# Patient Record
Sex: FEMALE | Race: WHITE | NOT HISPANIC OR LATINO | Employment: UNEMPLOYED | URBAN - METROPOLITAN AREA
[De-identification: names, ages, dates, MRNs, and addresses within clinical notes are randomized per-mention and may not be internally consistent; named-entity substitution may affect disease eponyms.]

---

## 2023-10-31 DIAGNOSIS — K50.919 CROHN'S DISEASE WITH COMPLICATION, UNSPECIFIED GASTROINTESTINAL TRACT LOCATION (MULTI): ICD-10-CM

## 2023-10-31 PROBLEM — Z86.010 HISTORY OF COLON POLYPS: Status: ACTIVE | Noted: 2023-10-31

## 2023-10-31 PROBLEM — F31.9 BIPOLAR DISORDER (MULTI): Status: ACTIVE | Noted: 2023-10-31

## 2023-10-31 PROBLEM — K52.9 CHRONIC DIARRHEA: Status: ACTIVE | Noted: 2023-10-31

## 2023-10-31 PROBLEM — R63.4 WEIGHT LOSS, UNINTENTIONAL: Status: ACTIVE | Noted: 2023-10-31

## 2023-10-31 PROBLEM — K44.9 HIATAL HERNIA: Status: ACTIVE | Noted: 2023-10-31

## 2023-10-31 PROBLEM — K50.00 CROHN'S DISEASE OF SMALL INTESTINE WITHOUT COMPLICATION (MULTI): Status: ACTIVE | Noted: 2023-10-31

## 2023-10-31 PROBLEM — E46 MALNUTRITION (MULTI): Status: ACTIVE | Noted: 2023-10-31

## 2023-10-31 PROBLEM — T81.31XA RUPTURE OF OPERATION WOUND: Status: ACTIVE | Noted: 2023-10-31

## 2023-10-31 PROBLEM — Z86.0100 HISTORY OF COLON POLYPS: Status: ACTIVE | Noted: 2023-10-31

## 2023-10-31 PROBLEM — M12.9 ARTHROPATHY: Status: ACTIVE | Noted: 2023-10-31

## 2023-10-31 PROBLEM — E87.6 POTASSIUM DEFICIENCY: Status: ACTIVE | Noted: 2023-10-31

## 2023-10-31 PROBLEM — I50.22 CHRONIC SYSTOLIC HEART FAILURE (MULTI): Status: ACTIVE | Noted: 2023-10-31

## 2023-10-31 PROBLEM — M48.00 SPINAL STENOSIS: Status: ACTIVE | Noted: 2023-10-31

## 2023-10-31 PROBLEM — J45.909 ASTHMA (HHS-HCC): Status: ACTIVE | Noted: 2023-10-31

## 2023-10-31 RX ORDER — LORAZEPAM 1 MG/1
1 TABLET ORAL 3 TIMES DAILY PRN
COMMUNITY

## 2023-10-31 RX ORDER — ALBUTEROL SULFATE 0.83 MG/ML
3 SOLUTION RESPIRATORY (INHALATION) AS NEEDED
Status: CANCELLED | OUTPATIENT
Start: 2023-10-31

## 2023-10-31 RX ORDER — OXYCODONE HYDROCHLORIDE 5 MG/1
5 TABLET ORAL EVERY 6 HOURS PRN
COMMUNITY
Start: 2023-09-19

## 2023-10-31 RX ORDER — GABAPENTIN 300 MG/1
900 CAPSULE ORAL 3 TIMES DAILY
COMMUNITY
Start: 2023-10-27

## 2023-10-31 RX ORDER — LIDOCAINE 5% 5 G/100G
CREAM TOPICAL
COMMUNITY
Start: 2023-01-16

## 2023-10-31 RX ORDER — CLOPIDOGREL BISULFATE 75 MG/1
75 TABLET ORAL DAILY
COMMUNITY
Start: 2023-10-17

## 2023-10-31 RX ORDER — ALBUTEROL SULFATE 90 UG/1
2 AEROSOL, METERED RESPIRATORY (INHALATION) EVERY 4 HOURS PRN
COMMUNITY

## 2023-10-31 RX ORDER — GUAIFENESIN 600 MG/1
600 TABLET, EXTENDED RELEASE ORAL NIGHTLY
COMMUNITY
Start: 2023-10-18

## 2023-10-31 RX ORDER — POTASSIUM CHLORIDE 20 MEQ/1
20 TABLET, EXTENDED RELEASE ORAL DAILY
COMMUNITY
Start: 2023-09-19

## 2023-10-31 RX ORDER — FAMOTIDINE 10 MG/ML
20 INJECTION INTRAVENOUS ONCE AS NEEDED
Status: CANCELLED | OUTPATIENT
Start: 2023-10-31

## 2023-10-31 RX ORDER — HYDROXYZINE HYDROCHLORIDE 50 MG/1
50 TABLET, FILM COATED ORAL 4 TIMES DAILY
COMMUNITY
Start: 2023-01-16

## 2023-10-31 RX ORDER — ATORVASTATIN CALCIUM 20 MG/1
20 TABLET, FILM COATED ORAL NIGHTLY
COMMUNITY
Start: 2023-09-15

## 2023-10-31 RX ORDER — ERGOCALCIFEROL 1.25 MG/1
1.25 CAPSULE ORAL
COMMUNITY

## 2023-10-31 RX ORDER — DIPHENHYDRAMINE HYDROCHLORIDE 25 MG/1
25 CAPSULE ORAL NIGHTLY PRN
COMMUNITY
Start: 2023-09-19

## 2023-10-31 RX ORDER — DIPHENHYDRAMINE HYDROCHLORIDE 50 MG/ML
50 INJECTION INTRAMUSCULAR; INTRAVENOUS AS NEEDED
Status: CANCELLED | OUTPATIENT
Start: 2023-10-31

## 2023-10-31 RX ORDER — VILAZODONE HYDROCHLORIDE 40 MG/1
40 TABLET ORAL DAILY
COMMUNITY
Start: 2023-05-16

## 2023-10-31 RX ORDER — DOXEPIN HYDROCHLORIDE 10 MG/1
50 CAPSULE ORAL NIGHTLY
COMMUNITY
Start: 2023-07-24

## 2023-10-31 RX ORDER — EPINEPHRINE 0.3 MG/.3ML
0.3 INJECTION SUBCUTANEOUS EVERY 5 MIN PRN
Status: CANCELLED | OUTPATIENT
Start: 2023-10-31

## 2023-10-31 RX ORDER — FAMOTIDINE 40 MG/1
40 TABLET, FILM COATED ORAL 2 TIMES DAILY
COMMUNITY

## 2023-10-31 RX ORDER — ARIPIPRAZOLE 2 MG/1
2 TABLET ORAL DAILY
COMMUNITY
Start: 2023-09-09

## 2023-10-31 RX ORDER — LEVOFLOXACIN 750 MG/1
750 TABLET ORAL DAILY
COMMUNITY
Start: 2023-01-18

## 2023-10-31 RX ORDER — FOLIC ACID 0.4 MG
0.4 TABLET ORAL DAILY
COMMUNITY
Start: 2023-10-26

## 2023-10-31 RX ORDER — CETIRIZINE HYDROCHLORIDE 10 MG/1
10 TABLET ORAL DAILY
COMMUNITY
Start: 2023-09-19

## 2023-10-31 RX ORDER — ASPIRIN 81 MG/1
81 TABLET ORAL DAILY
COMMUNITY
Start: 2023-09-15

## 2023-10-31 RX ORDER — TIOTROPIUM BROMIDE INHALATION SPRAY 3.12 UG/1
2 SPRAY, METERED RESPIRATORY (INHALATION) DAILY
COMMUNITY

## 2023-11-01 ENCOUNTER — TELEPHONE (OUTPATIENT)
Dept: PAIN MEDICINE | Facility: CLINIC | Age: 53
End: 2023-11-01
Payer: COMMERCIAL

## 2023-12-15 DIAGNOSIS — K50.919 CROHN'S DISEASE WITH COMPLICATION, UNSPECIFIED GASTROINTESTINAL TRACT LOCATION (MULTI): ICD-10-CM

## 2024-10-22 ENCOUNTER — APPOINTMENT (OUTPATIENT)
Dept: CT IMAGING | Age: 54
End: 2024-10-22
Payer: COMMERCIAL

## 2024-10-22 ENCOUNTER — HOSPITAL ENCOUNTER (INPATIENT)
Age: 54
LOS: 3 days | Discharge: HOME | End: 2024-10-25
Attending: STUDENT IN AN ORGANIZED HEALTH CARE EDUCATION/TRAINING PROGRAM | Admitting: INTERNAL MEDICINE
Payer: COMMERCIAL

## 2024-10-22 ENCOUNTER — APPOINTMENT (OUTPATIENT)
Dept: GENERAL RADIOLOGY | Age: 54
End: 2024-10-22
Payer: COMMERCIAL

## 2024-10-22 DIAGNOSIS — R79.89 ELEVATED TROPONIN: Primary | ICD-10-CM

## 2024-10-22 DIAGNOSIS — J42 CHRONIC BRONCHITIS, UNSPECIFIED CHRONIC BRONCHITIS TYPE (HCC): ICD-10-CM

## 2024-10-22 DIAGNOSIS — R07.9 CHEST PAIN, UNSPECIFIED TYPE: ICD-10-CM

## 2024-10-22 DIAGNOSIS — R91.1 LUNG NODULE: ICD-10-CM

## 2024-10-22 DIAGNOSIS — I42.9 CARDIOMYOPATHY, UNSPECIFIED TYPE (HCC): ICD-10-CM

## 2024-10-22 PROBLEM — I21.4 NSTEMI (NON-ST ELEVATED MYOCARDIAL INFARCTION) (HCC): Status: ACTIVE | Noted: 2024-10-22

## 2024-10-22 LAB
ALBUMIN SERPL-MCNC: 3.3 G/DL (ref 3.5–4.6)
ALP SERPL-CCNC: 67 U/L (ref 40–130)
ALT SERPL-CCNC: 14 U/L (ref 0–33)
ANION GAP SERPL CALCULATED.3IONS-SCNC: 12 MEQ/L (ref 9–15)
AST SERPL-CCNC: 27 U/L (ref 0–35)
BASE EXCESS VENOUS: 10 (ref -3–3)
BASOPHILS # BLD: 0 K/UL (ref 0–0.2)
BASOPHILS NFR BLD: 0.3 %
BILIRUB SERPL-MCNC: 0.6 MG/DL (ref 0.2–0.7)
BNP BLD-MCNC: NORMAL PG/ML
BUN SERPL-MCNC: 20 MG/DL (ref 6–20)
CALCIUM IONIZED: 1.43 MMOL/L (ref 1.12–1.32)
CALCIUM SERPL-MCNC: 11.2 MG/DL (ref 8.5–9.9)
CHLORIDE SERPL-SCNC: 97 MEQ/L (ref 95–107)
CO2 SERPL-SCNC: 28 MEQ/L (ref 20–31)
CREAT SERPL-MCNC: 1.31 MG/DL (ref 0.5–0.9)
EOSINOPHIL # BLD: 0.5 K/UL (ref 0–0.7)
EOSINOPHIL NFR BLD: 2 %
ERYTHROCYTE [DISTWIDTH] IN BLOOD BY AUTOMATED COUNT: 13.8 % (ref 11.5–14.5)
GLOBULIN SER CALC-MCNC: 2.4 G/DL (ref 2.3–3.5)
GLUCOSE BLD-MCNC: 142 MG/DL (ref 70–99)
GLUCOSE SERPL-MCNC: 130 MG/DL (ref 70–99)
HCO3 VENOUS: 33 MMOL/L (ref 23–29)
HCT VFR BLD AUTO: 43.9 % (ref 37–47)
HCT VFR BLD AUTO: 52 % (ref 36–48)
HGB BLD CALC-MCNC: 17.8 GM/DL (ref 12–16)
HGB BLD-MCNC: 15.6 G/DL (ref 12–16)
LACTATE BLDV-SCNC: 2 MMOL/L (ref 0.5–2.2)
LACTATE: 2.42 MMOL/L (ref 0.4–2)
LYMPHOCYTES # BLD: 1 K/UL (ref 1–4.8)
LYMPHOCYTES NFR BLD: 3 %
MAGNESIUM SERPL-MCNC: 1.6 MG/DL (ref 1.7–2.4)
MCH RBC QN AUTO: 32.9 PG (ref 27–31.3)
MCHC RBC AUTO-ENTMCNC: 35.5 % (ref 33–37)
MCV RBC AUTO: 92.6 FL (ref 79.4–94.8)
MONOCYTES # BLD: 1 K/UL (ref 0.2–0.8)
MONOCYTES NFR BLD: 3.8 %
NEUTROPHILS # BLD: 21.8 K/UL (ref 1.4–6.5)
NEUTS SEG NFR BLD: 91 %
PCO2 VENOUS: 43.9 MM HG (ref 40–50)
PERFORMED ON: ABNORMAL
PH VENOUS: 7.48 (ref 7.32–7.42)
PLATELET # BLD AUTO: 342 K/UL (ref 130–400)
PLATELET BLD QL SMEAR: ADEQUATE
PO2 VENOUS: <22 MM HG
POC CHLORIDE: 95 MEQ/L (ref 99–110)
POC CREATININE: 1.7 MG/DL (ref 0.6–1.2)
POC SAMPLE TYPE: ABNORMAL
POTASSIUM SERPL-SCNC: 3.7 MEQ/L (ref 3.4–4.9)
POTASSIUM SERPL-SCNC: 4.1 MEQ/L (ref 3.5–5.1)
PROCALCITONIN SERPL IA-MCNC: 0.08 NG/ML (ref 0–0.15)
PROT SERPL-MCNC: 5.7 G/DL (ref 6.3–8)
RBC # BLD AUTO: 4.74 M/UL (ref 4.2–5.4)
SLIDE REVIEW: ABNORMAL
SMUDGE CELLS BLD QL SMEAR: 5.7
SODIUM BLD-SCNC: 136 MEQ/L (ref 136–145)
SODIUM SERPL-SCNC: 137 MEQ/L (ref 135–144)
TCO2 CALC VENOUS: 34 MMOL/L
TOXIC GRANULATION: ABNORMAL
TROPONIN, HIGH SENSITIVITY: 335 NG/L (ref 0–19)
VARIANT LYMPHS NFR BLD: 1 %
WBC # BLD AUTO: 24 K/UL (ref 4.8–10.8)

## 2024-10-22 PROCEDURE — 84132 ASSAY OF SERUM POTASSIUM: CPT

## 2024-10-22 PROCEDURE — 36415 COLL VENOUS BLD VENIPUNCTURE: CPT

## 2024-10-22 PROCEDURE — 2580000003 HC RX 258: Performed by: STUDENT IN AN ORGANIZED HEALTH CARE EDUCATION/TRAINING PROGRAM

## 2024-10-22 PROCEDURE — 82565 ASSAY OF CREATININE: CPT

## 2024-10-22 PROCEDURE — 83735 ASSAY OF MAGNESIUM: CPT

## 2024-10-22 PROCEDURE — 82800 BLOOD PH: CPT

## 2024-10-22 PROCEDURE — 6360000002 HC RX W HCPCS

## 2024-10-22 PROCEDURE — 85014 HEMATOCRIT: CPT

## 2024-10-22 PROCEDURE — 2060000000 HC ICU INTERMEDIATE R&B

## 2024-10-22 PROCEDURE — 83880 ASSAY OF NATRIURETIC PEPTIDE: CPT

## 2024-10-22 PROCEDURE — 82435 ASSAY OF BLOOD CHLORIDE: CPT

## 2024-10-22 PROCEDURE — 6360000002 HC RX W HCPCS: Performed by: STUDENT IN AN ORGANIZED HEALTH CARE EDUCATION/TRAINING PROGRAM

## 2024-10-22 PROCEDURE — 84295 ASSAY OF SERUM SODIUM: CPT

## 2024-10-22 PROCEDURE — 83605 ASSAY OF LACTIC ACID: CPT

## 2024-10-22 PROCEDURE — 96372 THER/PROPH/DIAG INJ SC/IM: CPT

## 2024-10-22 PROCEDURE — 84145 PROCALCITONIN (PCT): CPT

## 2024-10-22 PROCEDURE — 80053 COMPREHEN METABOLIC PANEL: CPT

## 2024-10-22 PROCEDURE — 6360000004 HC RX CONTRAST MEDICATION: Performed by: STUDENT IN AN ORGANIZED HEALTH CARE EDUCATION/TRAINING PROGRAM

## 2024-10-22 PROCEDURE — 94640 AIRWAY INHALATION TREATMENT: CPT

## 2024-10-22 PROCEDURE — 2500000003 HC RX 250 WO HCPCS

## 2024-10-22 PROCEDURE — 71046 X-RAY EXAM CHEST 2 VIEWS: CPT

## 2024-10-22 PROCEDURE — 84484 ASSAY OF TROPONIN QUANT: CPT

## 2024-10-22 PROCEDURE — 71275 CT ANGIOGRAPHY CHEST: CPT

## 2024-10-22 PROCEDURE — 96374 THER/PROPH/DIAG INJ IV PUSH: CPT

## 2024-10-22 PROCEDURE — 94761 N-INVAS EAR/PLS OXIMETRY MLT: CPT

## 2024-10-22 PROCEDURE — 93005 ELECTROCARDIOGRAM TRACING: CPT | Performed by: STUDENT IN AN ORGANIZED HEALTH CARE EDUCATION/TRAINING PROGRAM

## 2024-10-22 PROCEDURE — 85025 COMPLETE CBC W/AUTO DIFF WBC: CPT

## 2024-10-22 PROCEDURE — 6370000000 HC RX 637 (ALT 250 FOR IP): Performed by: STUDENT IN AN ORGANIZED HEALTH CARE EDUCATION/TRAINING PROGRAM

## 2024-10-22 PROCEDURE — 82330 ASSAY OF CALCIUM: CPT

## 2024-10-22 PROCEDURE — 36600 WITHDRAWAL OF ARTERIAL BLOOD: CPT

## 2024-10-22 PROCEDURE — 96375 TX/PRO/DX INJ NEW DRUG ADDON: CPT

## 2024-10-22 PROCEDURE — 99285 EMERGENCY DEPT VISIT HI MDM: CPT

## 2024-10-22 RX ORDER — ONDANSETRON 4 MG/1
4 TABLET, ORALLY DISINTEGRATING ORAL EVERY 8 HOURS PRN
Status: DISCONTINUED | OUTPATIENT
Start: 2024-10-22 | End: 2024-10-25 | Stop reason: HOSPADM

## 2024-10-22 RX ORDER — IOPAMIDOL 755 MG/ML
75 INJECTION, SOLUTION INTRAVASCULAR
Status: COMPLETED | OUTPATIENT
Start: 2024-10-22 | End: 2024-10-22

## 2024-10-22 RX ORDER — FAMOTIDINE 40 MG/1
40 TABLET, FILM COATED ORAL 2 TIMES DAILY
COMMUNITY

## 2024-10-22 RX ORDER — SODIUM CHLORIDE 9 MG/ML
INJECTION, SOLUTION INTRAVENOUS CONTINUOUS
Status: DISCONTINUED | OUTPATIENT
Start: 2024-10-23 | End: 2024-10-23

## 2024-10-22 RX ORDER — FENTANYL CITRATE 0.05 MG/ML
50 INJECTION, SOLUTION INTRAMUSCULAR; INTRAVENOUS ONCE
Status: COMPLETED | OUTPATIENT
Start: 2024-10-22 | End: 2024-10-22

## 2024-10-22 RX ORDER — CLOPIDOGREL BISULFATE 75 MG/1
75 TABLET ORAL DAILY
COMMUNITY
Start: 2023-12-16

## 2024-10-22 RX ORDER — ASPIRIN 81 MG/1
324 TABLET, CHEWABLE ORAL ONCE
Status: COMPLETED | OUTPATIENT
Start: 2024-10-22 | End: 2024-10-22

## 2024-10-22 RX ORDER — POLYETHYLENE GLYCOL 3350 17 G/17G
17 POWDER, FOR SOLUTION ORAL DAILY PRN
Status: DISCONTINUED | OUTPATIENT
Start: 2024-10-22 | End: 2024-10-25 | Stop reason: HOSPADM

## 2024-10-22 RX ORDER — ATORVASTATIN CALCIUM 20 MG/1
20 TABLET, FILM COATED ORAL NIGHTLY
COMMUNITY
Start: 2023-11-17 | End: 2024-11-16

## 2024-10-22 RX ORDER — ATORVASTATIN CALCIUM 20 MG/1
20 TABLET, FILM COATED ORAL NIGHTLY
Status: DISCONTINUED | OUTPATIENT
Start: 2024-10-23 | End: 2024-10-25 | Stop reason: HOSPADM

## 2024-10-22 RX ORDER — CLOPIDOGREL BISULFATE 75 MG/1
75 TABLET ORAL DAILY
Status: DISCONTINUED | OUTPATIENT
Start: 2024-10-23 | End: 2024-10-25 | Stop reason: HOSPADM

## 2024-10-22 RX ORDER — PREDNISONE 10 MG/1
10 TABLET ORAL DAILY
COMMUNITY

## 2024-10-22 RX ORDER — ALBUTEROL SULFATE 0.83 MG/ML
2.5 SOLUTION RESPIRATORY (INHALATION) PRN
Status: DISCONTINUED | OUTPATIENT
Start: 2024-10-22 | End: 2024-10-23

## 2024-10-22 RX ORDER — NITROGLYCERIN 0.4 MG/1
0.4 TABLET SUBLINGUAL EVERY 5 MIN PRN
Status: DISCONTINUED | OUTPATIENT
Start: 2024-10-22 | End: 2024-10-25 | Stop reason: HOSPADM

## 2024-10-22 RX ORDER — MAGNESIUM SULFATE IN WATER 40 MG/ML
2000 INJECTION, SOLUTION INTRAVENOUS ONCE
Status: COMPLETED | OUTPATIENT
Start: 2024-10-22 | End: 2024-10-22

## 2024-10-22 RX ORDER — PREDNISONE 10 MG/1
10 TABLET ORAL DAILY
Status: DISCONTINUED | OUTPATIENT
Start: 2024-10-23 | End: 2024-10-25 | Stop reason: HOSPADM

## 2024-10-22 RX ORDER — ONDANSETRON 2 MG/ML
4 INJECTION INTRAMUSCULAR; INTRAVENOUS EVERY 6 HOURS PRN
Status: DISCONTINUED | OUTPATIENT
Start: 2024-10-22 | End: 2024-10-25 | Stop reason: HOSPADM

## 2024-10-22 RX ORDER — ENOXAPARIN SODIUM 100 MG/ML
1 INJECTION SUBCUTANEOUS ONCE
Status: COMPLETED | OUTPATIENT
Start: 2024-10-22 | End: 2024-10-22

## 2024-10-22 RX ORDER — 0.9 % SODIUM CHLORIDE 0.9 %
750 INTRAVENOUS SOLUTION INTRAVENOUS ONCE
Status: COMPLETED | OUTPATIENT
Start: 2024-10-22 | End: 2024-10-23

## 2024-10-22 RX ORDER — ACETAMINOPHEN 325 MG/1
650 TABLET ORAL EVERY 6 HOURS PRN
Status: DISCONTINUED | OUTPATIENT
Start: 2024-10-22 | End: 2024-10-25 | Stop reason: HOSPADM

## 2024-10-22 RX ORDER — HEPARIN SODIUM 5000 [USP'U]/ML
5000 INJECTION, SOLUTION INTRAVENOUS; SUBCUTANEOUS 2 TIMES DAILY
Status: DISCONTINUED | OUTPATIENT
Start: 2024-10-23 | End: 2024-10-23

## 2024-10-22 RX ORDER — ARIPIPRAZOLE 2 MG/1
2 TABLET ORAL DAILY
Status: DISCONTINUED | OUTPATIENT
Start: 2024-10-23 | End: 2024-10-25 | Stop reason: HOSPADM

## 2024-10-22 RX ORDER — 0.9 % SODIUM CHLORIDE 0.9 %
250 INTRAVENOUS SOLUTION INTRAVENOUS ONCE
Status: COMPLETED | OUTPATIENT
Start: 2024-10-22 | End: 2024-10-22

## 2024-10-22 RX ORDER — ASPIRIN 81 MG/1
81 TABLET ORAL DAILY
Status: DISCONTINUED | OUTPATIENT
Start: 2024-10-23 | End: 2024-10-25

## 2024-10-22 RX ORDER — FAMOTIDINE 20 MG/1
20 TABLET, FILM COATED ORAL DAILY
Status: DISCONTINUED | OUTPATIENT
Start: 2024-10-23 | End: 2024-10-25 | Stop reason: HOSPADM

## 2024-10-22 RX ORDER — ACETAMINOPHEN 650 MG/1
650 SUPPOSITORY RECTAL EVERY 6 HOURS PRN
Status: DISCONTINUED | OUTPATIENT
Start: 2024-10-22 | End: 2024-10-25 | Stop reason: HOSPADM

## 2024-10-22 RX ORDER — SODIUM CHLORIDE 0.9 % (FLUSH) 0.9 %
5-40 SYRINGE (ML) INJECTION EVERY 12 HOURS SCHEDULED
Status: DISCONTINUED | OUTPATIENT
Start: 2024-10-23 | End: 2024-10-25 | Stop reason: HOSPADM

## 2024-10-22 RX ORDER — SODIUM CHLORIDE 0.9 % (FLUSH) 0.9 %
5-40 SYRINGE (ML) INJECTION PRN
Status: DISCONTINUED | OUTPATIENT
Start: 2024-10-22 | End: 2024-10-25 | Stop reason: HOSPADM

## 2024-10-22 RX ORDER — SODIUM CHLORIDE 9 MG/ML
INJECTION, SOLUTION INTRAVENOUS PRN
Status: DISCONTINUED | OUTPATIENT
Start: 2024-10-22 | End: 2024-10-25 | Stop reason: HOSPADM

## 2024-10-22 RX ORDER — LANOLIN ALCOHOL/MO/W.PET/CERES
3 CREAM (GRAM) TOPICAL NIGHTLY PRN
Status: DISCONTINUED | OUTPATIENT
Start: 2024-10-22 | End: 2024-10-25 | Stop reason: HOSPADM

## 2024-10-22 RX ORDER — ARIPIPRAZOLE 2 MG/1
2 TABLET ORAL DAILY
COMMUNITY
Start: 2024-10-01

## 2024-10-22 RX ORDER — LORAZEPAM 1 MG/1
1 TABLET ORAL DAILY PRN
COMMUNITY
Start: 2024-09-24

## 2024-10-22 RX ORDER — ASPIRIN 81 MG/1
81 TABLET, COATED ORAL DAILY
Status: ON HOLD | COMMUNITY
End: 2024-10-25 | Stop reason: HOSPADM

## 2024-10-22 RX ADMIN — METHYLPREDNISOLONE SODIUM SUCCINATE 125 MG: 125 INJECTION INTRAMUSCULAR; INTRAVENOUS at 20:11

## 2024-10-22 RX ADMIN — FENTANYL CITRATE 50 MCG: 0.05 INJECTION, SOLUTION INTRAMUSCULAR; INTRAVENOUS at 21:15

## 2024-10-22 RX ADMIN — ALBUTEROL SULFATE 2.5 MG: 2.5 SOLUTION RESPIRATORY (INHALATION) at 19:55

## 2024-10-22 RX ADMIN — SODIUM CHLORIDE 250 ML: 9 INJECTION, SOLUTION INTRAVENOUS at 22:00

## 2024-10-22 RX ADMIN — NITROGLYCERIN 0.4 MG: 0.4 TABLET SUBLINGUAL at 20:38

## 2024-10-22 RX ADMIN — ASPIRIN 81 MG 324 MG: 81 TABLET ORAL at 19:55

## 2024-10-22 RX ADMIN — IOPAMIDOL 75 ML: 755 INJECTION, SOLUTION INTRAVENOUS at 21:47

## 2024-10-22 RX ADMIN — MAGNESIUM SULFATE HEPTAHYDRATE 2000 MG: 40 INJECTION, SOLUTION INTRAVENOUS at 20:10

## 2024-10-22 RX ADMIN — ENOXAPARIN SODIUM 50 MG: 100 INJECTION SUBCUTANEOUS at 21:59

## 2024-10-22 ASSESSMENT — PAIN SCALES - GENERAL
PAINLEVEL_OUTOF10: 7
PAINLEVEL_OUTOF10: 10
PAINLEVEL_OUTOF10: 10

## 2024-10-22 ASSESSMENT — PAIN DESCRIPTION - LOCATION
LOCATION: CHEST
LOCATION: CHEST

## 2024-10-22 ASSESSMENT — LIFESTYLE VARIABLES
HOW MANY STANDARD DRINKS CONTAINING ALCOHOL DO YOU HAVE ON A TYPICAL DAY: PATIENT DOES NOT DRINK
HOW OFTEN DO YOU HAVE A DRINK CONTAINING ALCOHOL: NEVER

## 2024-10-22 ASSESSMENT — PAIN - FUNCTIONAL ASSESSMENT: PAIN_FUNCTIONAL_ASSESSMENT: 0-10

## 2024-10-22 NOTE — ED PROVIDER NOTES
[facility] TELEMETRY  EMERGENCY DEPARTMENT ENCOUNTER      Pt Name: Jhoana London  MRN: [id number]  Birthdate 1970  Date of evaluation: 10/22/2024  Provider: Denys Pike MD  4:39 AM    CHIEF COMPLAINT       Chief Complaint   Patient presents with    Chest Pain     x2days    Neck Pain    Back Pain         HISTORY OF PRESENT ILLNESS    Jhoana London is a 54 y.o. female who presents to the emergency department chest pressure    HPI  Patient is a 54-year-old female presenting to ED due to centralized chest pressure and shortness of breath.  Patient endorses that she developed some centralized chest pressure at some point yesterday, she cannot remember the time.  Chest pressure is constant radiating into her upper back and right side of her neck.  She endorses nausea and mucousy vomit.  She endorses feeling short of breath, chest pain increases with inspiration.  She is coughing.  She denies any runny nose or congestion or sore throat.  She denies any fevers or chills but does feel generally weak and fatigued.  She endorses that she takes aspirin, no blood thinners, she has not taken any of her medicine for the past 3 days due to feeling ill.  She denies having history of heart attack.  She endorses that she smokes a pack of cigarettes a day.  She denies being on blood pressure medicine.  She denies having history of blood clots.  She denies any swelling or pain or redness to either of her calfs, no recent surgeries or traumatic injuries in the past month, no recent cancer history in the past 6 months regarding chemotherapy.    Nursing Notes were reviewed.    REVIEW OF SYSTEMS       Review of Systems   Constitutional:  Positive for activity change and fatigue. Negative for appetite change, chills and fever.   HENT:  Negative for congestion, postnasal drip, rhinorrhea, sinus pressure and sinus pain.    Eyes:  Negative for photophobia, pain, discharge, redness and itching.   Respiratory:  Positive for chest tightness and  MOLECULAR, WITH COVID-19   PROCALCITONIN    Narrative:     CALL  [facility] tel. [phone],  Chemistry results called to and read back by dr jiménez, 10/22/2024 19:20, by  REGINE   LACTIC ACID   BRAIN NATRIURETIC PEPTIDE   LACTIC ACID   COMPREHENSIVE METABOLIC PANEL W/ REFLEX TO MG FOR LOW K   CBC WITH AUTO DIFFERENTIAL   PTH-RELATED PEPTIDE   VITAMIN D 25 HYDROXY   MAGNESIUM   POCT EPOC BLOOD GAS, LACTIC ACID, ICA       All other labs were within normal range or not returned as of this dictation.    EMERGENCY DEPARTMENT COURSE and DIFFERENTIAL DIAGNOSIS/MDM:   Vitals:    Vitals:    10/22/24 2030 10/22/24 2038 10/22/24 2320 10/23/24 0055   BP: 102/60 102/60 90/64    Pulse: 98 (!) 101 86    Resp: 20  20    Temp:   99.7 °F (37.6 °C)    TempSrc:   Oral    SpO2: 91%  98%    Weight:   48.9 kg (107 lb 12.8 oz) 48.9 kg (107 lb 12.8 oz)   Height:               Medical Decision Making  Amount and/or Complexity of Data Reviewed  Labs: ordered.  Radiology: ordered.  ECG/medicine tests: ordered.    Risk  OTC drugs.  Prescription drug management.  Decision regarding hospitalization.      Patient is a 54-year-old female presenting to the ED due to concern for centralized chest pressure and shortness of breath    With initial presentation, patient was not in immediate distress.  She was afebrile.  She was not tachycardic or hypotensive initially.  She was not tachypneic or hypoxic.  Patient endorsed that she developed some centralized chest pressure at some point yesterday.  The chest pressure was constant and radiating into her upper back and the right side of her neck.  She endorsed some nausea and mucousy vomit.  She did feel a bit short of breath as well and her chest pain increased with inspiration.  She did have a nonproductive cough.  Of note, she denied to me of having any history of STEMI/NSTEMI or previous PCI.  She also denied any history of hypertension or hyperlipidemia to me.  She does smoke a pack of cigarettes a

## 2024-10-22 NOTE — ED PROVIDER NOTES
Basic Information   Time Seen: 6:17 PM   Primary Care Provider: No primary care provider on file.     Chief Complaint   Patient presents with    Chest Pain     x2days    Neck Pain    Back Pain      HPI   Madai London is a 54 yrs female whom per chart review has no PMHx presents to ED for evaluation of shortness of breath.  Patient reports that she has been having increasing shortness of breath over the last 2 days.  Reports associated chest tightness.  Patient does report a PMHx of asthma, COPD.  Denies use of either PTA.  Patient is a current smoker.  Patient states that chest hurts when coughing, or taking deep breaths.  No additional complaints verbalized.   Physical Exam     /64 (10/22/24 1752)    Temp 98 °F (36.7 °C) (10/22/24 1752)    Pulse 93 (10/22/24 1752)   Resp 18 (10/22/24 1752)    SpO2 99 % (10/22/24 1752)       General: Awake and Alert, no acute distress   CV: RRR, S1, S2   Resp: Lungs diminished throughout with inspiratory, expiratory wheezing auscultated.   Other:   Impression and Plan     Labs Reviewed   CBC WITH AUTO DIFFERENTIAL   COMPREHENSIVE METABOLIC PANEL   MAGNESIUM   TROPONIN   TROPONIN   PROCALCITONIN   LACTIC ACID        XR CHEST (2 VW)    (Results Pending)      Final Impression   I have performed a medical screening exam on Madai London. Based on this patient's chief complaint/symptoms of   Chief Complaint   Patient presents with    Chest Pain     x2days    Neck Pain    Back Pain    and my focused exam, their care will be started and transitioned to provider when room is available.     Shasha Vergara, NP-C  10/22/24 3493

## 2024-10-23 ENCOUNTER — APPOINTMENT (OUTPATIENT)
Age: 54
End: 2024-10-23
Attending: INTERNAL MEDICINE
Payer: COMMERCIAL

## 2024-10-23 PROBLEM — R79.89 ELEVATED TROPONIN: Status: ACTIVE | Noted: 2024-10-23

## 2024-10-23 LAB
ALBUMIN SERPL-MCNC: 2.9 G/DL (ref 3.5–4.6)
ALP SERPL-CCNC: 54 U/L (ref 40–130)
ALT SERPL-CCNC: 11 U/L (ref 0–33)
ANION GAP SERPL CALCULATED.3IONS-SCNC: 12 MEQ/L (ref 9–15)
AST SERPL-CCNC: 24 U/L (ref 0–35)
B PARAP IS1001 DNA NPH QL NAA+NON-PROBE: NOT DETECTED
B PERT.PT PRMT NPH QL NAA+NON-PROBE: NOT DETECTED
BASOPHILS # BLD: 0 K/UL (ref 0–0.2)
BASOPHILS NFR BLD: 0.1 %
BILIRUB SERPL-MCNC: 0.4 MG/DL (ref 0.2–0.7)
BUN SERPL-MCNC: 22 MG/DL (ref 6–20)
C PNEUM DNA NPH QL NAA+NON-PROBE: NOT DETECTED
CALCIUM SERPL-MCNC: 9.1 MG/DL (ref 8.5–9.9)
CHLORIDE SERPL-SCNC: 97 MEQ/L (ref 95–107)
CO2 SERPL-SCNC: 24 MEQ/L (ref 20–31)
CREAT SERPL-MCNC: 1.41 MG/DL (ref 0.5–0.9)
ECHO AO ROOT DIAM: 2.5 CM
ECHO AO ROOT INDEX: 1.64 CM/M2
ECHO AV AREA PEAK VELOCITY: 1.6 CM2
ECHO AV AREA VTI: 1.4 CM2
ECHO AV AREA/BSA PEAK VELOCITY: 1.1 CM2/M2
ECHO AV AREA/BSA VTI: 0.9 CM2/M2
ECHO AV MEAN GRADIENT: 2 MMHG
ECHO AV MEAN VELOCITY: 0.7 M/S
ECHO AV PEAK GRADIENT: 3 MMHG
ECHO AV PEAK VELOCITY: 0.9 M/S
ECHO AV VELOCITY RATIO: 0.78
ECHO AV VTI: 19.2 CM
ECHO BSA: 1.53 M2
ECHO EST RA PRESSURE: 3 MMHG
ECHO LA VOL A-L A2C: 36 ML (ref 22–52)
ECHO LA VOL A-L A4C: 39 ML (ref 22–52)
ECHO LA VOL MOD A2C: 35 ML (ref 22–52)
ECHO LA VOL MOD A4C: 34 ML (ref 22–52)
ECHO LA VOLUME AREA LENGTH: 38 ML
ECHO LA VOLUME INDEX A-L A2C: 24 ML/M2 (ref 16–34)
ECHO LA VOLUME INDEX A-L A4C: 26 ML/M2 (ref 16–34)
ECHO LA VOLUME INDEX AREA LENGTH: 25 ML/M2 (ref 16–34)
ECHO LA VOLUME INDEX MOD A2C: 23 ML/M2 (ref 16–34)
ECHO LA VOLUME INDEX MOD A4C: 22 ML/M2 (ref 16–34)
ECHO LV E' LATERAL VELOCITY: 7.3 CM/S
ECHO LV E' SEPTAL VELOCITY: 4.5 CM/S
ECHO LV EDV A2C: 102 ML
ECHO LV EDV A4C: 133 ML
ECHO LV EDV BP: 124 ML (ref 56–104)
ECHO LV EDV INDEX A4C: 88 ML/M2
ECHO LV EDV INDEX BP: 82 ML/M2
ECHO LV EDV NDEX A2C: 67 ML/M2
ECHO LV EJECTION FRACTION A2C: 17 %
ECHO LV EJECTION FRACTION A4C: 24 %
ECHO LV EJECTION FRACTION BIPLANE: 18 % (ref 55–100)
ECHO LV ESV A2C: 85 ML
ECHO LV ESV A4C: 101 ML
ECHO LV ESV BP: 101 ML (ref 19–49)
ECHO LV ESV INDEX A2C: 56 ML/M2
ECHO LV ESV INDEX A4C: 66 ML/M2
ECHO LV ESV INDEX BP: 66 ML/M2
ECHO LV FRACTIONAL SHORTENING: 3 % (ref 28–44)
ECHO LV INTERNAL DIMENSION DIASTOLE INDEX: 2.5 CM/M2
ECHO LV INTERNAL DIMENSION DIASTOLIC: 3.8 CM (ref 3.9–5.3)
ECHO LV INTERNAL DIMENSION SYSTOLIC INDEX: 2.43 CM/M2
ECHO LV INTERNAL DIMENSION SYSTOLIC: 3.7 CM
ECHO LV IVSD: 1.3 CM (ref 0.6–0.9)
ECHO LV IVSS: 1.5 CM
ECHO LV MASS 2D: 163 G (ref 67–162)
ECHO LV MASS INDEX 2D: 107.2 G/M2 (ref 43–95)
ECHO LV POSTERIOR WALL DIASTOLIC: 1.2 CM (ref 0.6–0.9)
ECHO LV POSTERIOR WALL SYSTOLIC: 1.3 CM
ECHO LV RELATIVE WALL THICKNESS RATIO: 0.63
ECHO LVOT AREA: 2 CM2
ECHO LVOT AV VTI INDEX: 0.68
ECHO LVOT DIAM: 1.6 CM
ECHO LVOT MEAN GRADIENT: 1 MMHG
ECHO LVOT PEAK GRADIENT: 2 MMHG
ECHO LVOT PEAK VELOCITY: 0.7 M/S
ECHO LVOT STROKE VOLUME INDEX: 17.2 ML/M2
ECHO LVOT SV: 26.1 ML
ECHO LVOT VTI: 13 CM
ECHO MV A VELOCITY: 0.54 M/S
ECHO MV E DECELERATION TIME (DT): 190.2 MS
ECHO MV E VELOCITY: 0.52 M/S
ECHO MV E/A RATIO: 0.96
ECHO MV E/E' LATERAL: 7.12
ECHO MV E/E' RATIO (AVERAGED): 9.34
ECHO MV E/E' SEPTAL: 11.56
ECHO RIGHT VENTRICULAR SYSTOLIC PRESSURE (RVSP): 14 MMHG
ECHO RV TAPSE: 1.9 CM (ref 1.7–?)
ECHO TV REGURGITANT MAX VELOCITY: 1.69 M/S
ECHO TV REGURGITANT PEAK GRADIENT: 11 MMHG
EOSINOPHIL # BLD: 0 K/UL (ref 0–0.7)
EOSINOPHIL NFR BLD: 0.1 %
ERYTHROCYTE [DISTWIDTH] IN BLOOD BY AUTOMATED COUNT: 14 % (ref 11.5–14.5)
FLUAV RNA NPH QL NAA+NON-PROBE: NOT DETECTED
FLUBV RNA NPH QL NAA+NON-PROBE: NOT DETECTED
GLOBULIN SER CALC-MCNC: 2.3 G/DL (ref 2.3–3.5)
GLUCOSE SERPL-MCNC: 158 MG/DL (ref 70–99)
HADV DNA NPH QL NAA+NON-PROBE: NOT DETECTED
HCOV 229E RNA NPH QL NAA+NON-PROBE: NOT DETECTED
HCOV HKU1 RNA NPH QL NAA+NON-PROBE: NOT DETECTED
HCOV NL63 RNA NPH QL NAA+NON-PROBE: NOT DETECTED
HCOV OC43 RNA NPH QL NAA+NON-PROBE: NOT DETECTED
HCT VFR BLD AUTO: 41.1 % (ref 37–47)
HGB BLD-MCNC: 14 G/DL (ref 12–16)
HMPV RNA NPH QL NAA+NON-PROBE: NOT DETECTED
HPIV1 RNA NPH QL NAA+NON-PROBE: NOT DETECTED
HPIV2 RNA NPH QL NAA+NON-PROBE: NOT DETECTED
HPIV3 RNA NPH QL NAA+NON-PROBE: NOT DETECTED
HPIV4 RNA NPH QL NAA+NON-PROBE: NOT DETECTED
LACTATE BLDV-SCNC: 1 MMOL/L (ref 0.5–2.2)
LYMPHOCYTES # BLD: 1 K/UL (ref 1–4.8)
LYMPHOCYTES NFR BLD: 5.5 %
M PNEUMO DNA NPH QL NAA+NON-PROBE: NOT DETECTED
MAGNESIUM SERPL-MCNC: 2.3 MG/DL (ref 1.7–2.4)
MCH RBC QN AUTO: 31.5 PG (ref 27–31.3)
MCHC RBC AUTO-ENTMCNC: 34.1 % (ref 33–37)
MCV RBC AUTO: 92.4 FL (ref 79.4–94.8)
MONOCYTES # BLD: 0.7 K/UL (ref 0.2–0.8)
MONOCYTES NFR BLD: 3.6 %
NEUTROPHILS # BLD: 16.5 K/UL (ref 1.4–6.5)
NEUTS SEG NFR BLD: 90.1 %
PLATELET # BLD AUTO: 291 K/UL (ref 130–400)
POTASSIUM SERPL-SCNC: 3.7 MEQ/L (ref 3.4–4.9)
PROT SERPL-MCNC: 5.2 G/DL (ref 6.3–8)
PTH-INTACT SERPL-MCNC: 10.2 PG/ML (ref 15–65)
RBC # BLD AUTO: 4.45 M/UL (ref 4.2–5.4)
RSV RNA NPH QL NAA+NON-PROBE: NOT DETECTED
RV+EV RNA NPH QL NAA+NON-PROBE: NOT DETECTED
SARS-COV-2 RNA NPH QL NAA+NON-PROBE: NOT DETECTED
SODIUM SERPL-SCNC: 133 MEQ/L (ref 135–144)
TROPONIN, HIGH SENSITIVITY: 287 NG/L (ref 0–19)
VITAMIN D 25-HYDROXY: 66.9 NG/ML (ref 30–100)
WBC # BLD AUTO: 18.3 K/UL (ref 4.8–10.8)

## 2024-10-23 PROCEDURE — 2580000003 HC RX 258: Performed by: STUDENT IN AN ORGANIZED HEALTH CARE EDUCATION/TRAINING PROGRAM

## 2024-10-23 PROCEDURE — 93458 L HRT ARTERY/VENTRICLE ANGIO: CPT | Performed by: INTERNAL MEDICINE

## 2024-10-23 PROCEDURE — 93306 TTE W/DOPPLER COMPLETE: CPT | Performed by: INTERNAL MEDICINE

## 2024-10-23 PROCEDURE — 94761 N-INVAS EAR/PLS OXIMETRY MLT: CPT

## 2024-10-23 PROCEDURE — B2151ZZ FLUOROSCOPY OF LEFT HEART USING LOW OSMOLAR CONTRAST: ICD-10-PCS | Performed by: INTERNAL MEDICINE

## 2024-10-23 PROCEDURE — 84484 ASSAY OF TROPONIN QUANT: CPT

## 2024-10-23 PROCEDURE — 6370000000 HC RX 637 (ALT 250 FOR IP): Performed by: INTERNAL MEDICINE

## 2024-10-23 PROCEDURE — C1769 GUIDE WIRE: HCPCS | Performed by: INTERNAL MEDICINE

## 2024-10-23 PROCEDURE — 94640 AIRWAY INHALATION TREATMENT: CPT

## 2024-10-23 PROCEDURE — 99255 IP/OBS CONSLTJ NEW/EST HI 80: CPT | Performed by: INTERNAL MEDICINE

## 2024-10-23 PROCEDURE — 85025 COMPLETE CBC W/AUTO DIFF WBC: CPT

## 2024-10-23 PROCEDURE — 83605 ASSAY OF LACTIC ACID: CPT

## 2024-10-23 PROCEDURE — C1894 INTRO/SHEATH, NON-LASER: HCPCS | Performed by: INTERNAL MEDICINE

## 2024-10-23 PROCEDURE — 4A023N7 MEASUREMENT OF CARDIAC SAMPLING AND PRESSURE, LEFT HEART, PERCUTANEOUS APPROACH: ICD-10-PCS | Performed by: INTERNAL MEDICINE

## 2024-10-23 PROCEDURE — 2580000003 HC RX 258: Performed by: INTERNAL MEDICINE

## 2024-10-23 PROCEDURE — 83970 ASSAY OF PARATHORMONE: CPT

## 2024-10-23 PROCEDURE — 99152 MOD SED SAME PHYS/QHP 5/>YRS: CPT | Performed by: INTERNAL MEDICINE

## 2024-10-23 PROCEDURE — 7100000011 HC PHASE II RECOVERY - ADDTL 15 MIN: Performed by: INTERNAL MEDICINE

## 2024-10-23 PROCEDURE — 2709999900 HC NON-CHARGEABLE SUPPLY: Performed by: INTERNAL MEDICINE

## 2024-10-23 PROCEDURE — 7100000010 HC PHASE II RECOVERY - FIRST 15 MIN: Performed by: INTERNAL MEDICINE

## 2024-10-23 PROCEDURE — 82306 VITAMIN D 25 HYDROXY: CPT

## 2024-10-23 PROCEDURE — 6360000002 HC RX W HCPCS: Performed by: INTERNAL MEDICINE

## 2024-10-23 PROCEDURE — 2500000003 HC RX 250 WO HCPCS: Performed by: INTERNAL MEDICINE

## 2024-10-23 PROCEDURE — 80053 COMPREHEN METABOLIC PANEL: CPT

## 2024-10-23 PROCEDURE — 2700000000 HC OXYGEN THERAPY PER DAY

## 2024-10-23 PROCEDURE — C8929 TTE W OR WO FOL WCON,DOPPLER: HCPCS

## 2024-10-23 PROCEDURE — 36415 COLL VENOUS BLD VENIPUNCTURE: CPT

## 2024-10-23 PROCEDURE — 82542 COL CHROMOTOGRAPHY QUAL/QUAN: CPT

## 2024-10-23 PROCEDURE — 83735 ASSAY OF MAGNESIUM: CPT

## 2024-10-23 PROCEDURE — 6360000004 HC RX CONTRAST MEDICATION: Performed by: INTERNAL MEDICINE

## 2024-10-23 PROCEDURE — C1760 CLOSURE DEV, VASC: HCPCS | Performed by: INTERNAL MEDICINE

## 2024-10-23 PROCEDURE — 0202U NFCT DS 22 TRGT SARS-COV-2: CPT

## 2024-10-23 PROCEDURE — 2060000000 HC ICU INTERMEDIATE R&B

## 2024-10-23 RX ORDER — SODIUM CHLORIDE 0.9 % (FLUSH) 0.9 %
5-40 SYRINGE (ML) INJECTION PRN
Status: DISCONTINUED | OUTPATIENT
Start: 2024-10-23 | End: 2024-10-23 | Stop reason: HOSPADM

## 2024-10-23 RX ORDER — IPRATROPIUM BROMIDE AND ALBUTEROL SULFATE 2.5; .5 MG/3ML; MG/3ML
1 SOLUTION RESPIRATORY (INHALATION) 2 TIMES DAILY
Status: DISCONTINUED | OUTPATIENT
Start: 2024-10-24 | End: 2024-10-25 | Stop reason: HOSPADM

## 2024-10-23 RX ORDER — SODIUM CHLORIDE 0.9 % (FLUSH) 0.9 %
5-40 SYRINGE (ML) INJECTION EVERY 12 HOURS SCHEDULED
Status: DISCONTINUED | OUTPATIENT
Start: 2024-10-23 | End: 2024-10-23 | Stop reason: HOSPADM

## 2024-10-23 RX ORDER — SODIUM CHLORIDE 450 MG/100ML
75 INJECTION, SOLUTION INTRAVENOUS CONTINUOUS
Status: DISCONTINUED | OUTPATIENT
Start: 2024-10-23 | End: 2024-10-23 | Stop reason: HOSPADM

## 2024-10-23 RX ORDER — ONDANSETRON 2 MG/ML
4 INJECTION INTRAMUSCULAR; INTRAVENOUS EVERY 6 HOURS PRN
Status: DISCONTINUED | OUTPATIENT
Start: 2024-10-23 | End: 2024-10-23 | Stop reason: SDUPTHER

## 2024-10-23 RX ORDER — NITROGLYCERIN 0.4 MG/1
0.4 TABLET SUBLINGUAL EVERY 5 MIN PRN
Status: DISCONTINUED | OUTPATIENT
Start: 2024-10-23 | End: 2024-10-23 | Stop reason: HOSPADM

## 2024-10-23 RX ORDER — IOPAMIDOL 612 MG/ML
INJECTION, SOLUTION INTRAVASCULAR PRN
Status: DISCONTINUED | OUTPATIENT
Start: 2024-10-23 | End: 2024-10-23 | Stop reason: HOSPADM

## 2024-10-23 RX ORDER — SODIUM CHLORIDE 9 MG/ML
INJECTION, SOLUTION INTRAVENOUS PRN
Status: DISCONTINUED | OUTPATIENT
Start: 2024-10-23 | End: 2024-10-23 | Stop reason: HOSPADM

## 2024-10-23 RX ORDER — SODIUM CHLORIDE 450 MG/100ML
75 INJECTION, SOLUTION INTRAVENOUS CONTINUOUS
Status: DISCONTINUED | OUTPATIENT
Start: 2024-10-23 | End: 2024-10-23

## 2024-10-23 RX ORDER — LIDOCAINE HYDROCHLORIDE 10 MG/ML
INJECTION, SOLUTION INFILTRATION; PERINEURAL PRN
Status: DISCONTINUED | OUTPATIENT
Start: 2024-10-23 | End: 2024-10-23 | Stop reason: HOSPADM

## 2024-10-23 RX ORDER — DIPHENHYDRAMINE HYDROCHLORIDE 50 MG/ML
50 INJECTION INTRAMUSCULAR; INTRAVENOUS ONCE
Status: DISCONTINUED | OUTPATIENT
Start: 2024-10-23 | End: 2024-10-23 | Stop reason: HOSPADM

## 2024-10-23 RX ORDER — MIDAZOLAM HYDROCHLORIDE 1 MG/ML
INJECTION, SOLUTION INTRAMUSCULAR; INTRAVENOUS PRN
Status: DISCONTINUED | OUTPATIENT
Start: 2024-10-23 | End: 2024-10-23 | Stop reason: HOSPADM

## 2024-10-23 RX ORDER — IPRATROPIUM BROMIDE AND ALBUTEROL SULFATE 2.5; .5 MG/3ML; MG/3ML
1 SOLUTION RESPIRATORY (INHALATION) EVERY 4 HOURS PRN
Status: DISCONTINUED | OUTPATIENT
Start: 2024-10-23 | End: 2024-10-25 | Stop reason: HOSPADM

## 2024-10-23 RX ADMIN — POLYETHYLENE GLYCOL 3350 17 G: 17 POWDER, FOR SOLUTION ORAL at 18:32

## 2024-10-23 RX ADMIN — IPRATROPIUM BROMIDE AND ALBUTEROL SULFATE 1 DOSE: 2.5; .5 SOLUTION RESPIRATORY (INHALATION) at 21:25

## 2024-10-23 RX ADMIN — CLOPIDOGREL BISULFATE 75 MG: 75 TABLET ORAL at 08:24

## 2024-10-23 RX ADMIN — FAMOTIDINE 20 MG: 20 TABLET, FILM COATED ORAL at 13:30

## 2024-10-23 RX ADMIN — SODIUM CHLORIDE, PRESERVATIVE FREE 10 ML: 5 INJECTION INTRAVENOUS at 20:45

## 2024-10-23 RX ADMIN — SODIUM CHLORIDE: 9 INJECTION, SOLUTION INTRAVENOUS at 02:04

## 2024-10-23 RX ADMIN — Medication 3 MG: at 00:15

## 2024-10-23 RX ADMIN — SODIUM CHLORIDE 750 ML: 9 INJECTION, SOLUTION INTRAVENOUS at 00:00

## 2024-10-23 RX ADMIN — PREDNISONE 10 MG: 10 TABLET ORAL at 13:29

## 2024-10-23 RX ADMIN — SODIUM CHLORIDE, PRESERVATIVE FREE 10 ML: 5 INJECTION INTRAVENOUS at 08:24

## 2024-10-23 RX ADMIN — ARIPIPRAZOLE 2 MG: 2 TABLET ORAL at 13:30

## 2024-10-23 RX ADMIN — ASPIRIN 81 MG: 81 TABLET, COATED ORAL at 08:24

## 2024-10-23 ASSESSMENT — PAIN SCALES - GENERAL
PAINLEVEL_OUTOF10: 4
PAINLEVEL_OUTOF10: 0

## 2024-10-23 ASSESSMENT — ENCOUNTER SYMPTOMS
CHEST TIGHTNESS: 0
DIARRHEA: 0
RHINORRHEA: 0
NAUSEA: 0
PHOTOPHOBIA: 0
SINUS PAIN: 0
VOMITING: 0
CHEST TIGHTNESS: 1
WHEEZING: 0
COUGH: 0
COLOR CHANGE: 0
NAUSEA: 1
BLOOD IN STOOL: 0
SINUS PRESSURE: 0
EYE DISCHARGE: 0
ABDOMINAL PAIN: 0
EYE PAIN: 0
EYE REDNESS: 0
EYES NEGATIVE: 1
ABDOMINAL DISTENTION: 0
EYE ITCHING: 0
BACK PAIN: 0
STRIDOR: 0
SHORTNESS OF BREATH: 1

## 2024-10-23 ASSESSMENT — PAIN DESCRIPTION - LOCATION: LOCATION: CHEST

## 2024-10-23 NOTE — CARE COORDINATION
Case Management Assessment  Initial Evaluation    Date/Time of Evaluation: 10/23/2024 9:04 AM  Assessment Completed by: VIVIAN Escobar    If patient is discharged prior to next notation, then this note serves as note for discharge by case management.    Patient Name: Jhoana London                   YOB: 1970  Diagnosis: Lung nodule [R91.1]  Elevated troponin [R79.89]  NSTEMI (non-ST elevated myocardial infarction) (HCC) [I21.4]  Chronic bronchitis, unspecified chronic bronchitis type (HCC) [J42]  Chest pain, unspecified type [R07.9]                   Date / Time: 10/22/2024  7:27 PM    Patient Admission Status: Inpatient   Readmission Risk (Low < 19, Mod (19-27), High > 27): Readmission Risk Score: 10.3    Current PCP: None, None  PCP verified by CM? No    Chart Reviewed: Yes      History Provided by: Patient  Patient Orientation: Alert and Oriented    Patient Cognition: Alert    Hospitalization in the last 30 days (Readmission):  No    If yes, Readmission Assessment in CM Navigator will be completed.    Advance Directives:      Code Status: Full Code   Patient's Primary Decision Maker is: Legal Next of Kin      Discharge Planning:    Patient lives with: Children Type of Home: House  Primary Care Giver: Self  Patient Support Systems include: Children, Family Members, Friends/Neighbors   Current Financial resources: Medicaid  Current community resources: None (NONE AT THIS TIME)  Current services prior to admission: None            Current DME:              Type of Home Care services:  None    ADLS  Prior functional level: Independent in ADLs/IADLs  Current functional level: Independent in ADLs/IADLs    PT AM-PAC:   /24  OT AM-PAC:   /24    Family can provide assistance at DC: Yes  Would you like Case Management to discuss the discharge plan with any other family members/significant others, and if so, who? Yes (SON)  Plans to Return to Present Housing: Yes  Other Identified Issues/Barriers to RETURNING

## 2024-10-23 NOTE — PROGRESS NOTES
0845 - patient checked into pre/post, VSS, IV places, consents confirmed, BP soft    0900 - patient back to procedure    0945 - Patient back to pre/post R groin stable with closure device, BP remain soft, bedrest until 1140    Updated daughter via telephone    Called report to Ariella JUDD

## 2024-10-23 NOTE — H&P (VIEW-ONLY)
Consults    Patient Name: Jhoana London  Admit Date: 10/22/2024  7:27 PM  MR #: [id number]  : 1970    Attending Physician: Titus Altman MD  Reason for consult: NSTEMI    History of Presenting Illness:      Jhoana London is a 54 y.o. female on hospital day 1 with a history of .   History Obtained From:  patient, electronic medical record    Pt has h/o PAD and Crohn's presents with CP radiating to neck and upper back. She had nausea and did vomit. H&P and ER notes suggest she has CAD but she does not.  She has PAD and on DAPT for this reason. No prior cardiac Stents.    ECG SR 86 no acute changes noted.  HS Troponin 335 > 287.   She received ASA and Lovenox.  BP is borderline and no BB given.  She is more comfortable now and no ongoing CP.    CTA Chest no dissection nor PE.   History:      EKG:  Past Medical History:   Diagnosis Date    Smoker      No past surgical history on file.    Family History  No family history on file.  [] Unable to obtain due to ventilated and/ or neurologic status    Social History     Socioeconomic History    Marital status:      Spouse name: Not on file    Number of children: Not on file    Years of education: Not on file    Highest education level: Not on file   Occupational History    Not on file   Tobacco Use    Smoking status: Not on file    Smokeless tobacco: Not on file   Substance and Sexual Activity    Alcohol use: Not on file    Drug use: Not on file    Sexual activity: Not on file   Other Topics Concern    Not on file   Social History Narrative    Not on file     Social Determinants of Health     Financial Resource Strain: Medium Risk (2024)    Received from [facility]    Overall Financial Resource Strain (CARDIA)     Difficulty of Paying Living Expenses: Somewhat hard   Food Insecurity: No Food Insecurity (10/22/2024)    Hunger Vital Sign     Worried About Running Out of Food in the Last Year: Never true     Ran Out of Food in the Last Year: Never true

## 2024-10-23 NOTE — PLAN OF CARE
Nutrition Problem #1: Underweight  Intervention: Food and/or Nutrient Delivery: Start Oral Diet  Nutritional

## 2024-10-23 NOTE — PROGRESS NOTES
[facility] Hospitalist Progress Note    Admitting Date and Time: 10/22/2024  7:27 PM  Admit Dx: Lung nodule [R91.1]  Elevated troponin [R79.89]  NSTEMI (non-ST elevated myocardial infarction) (HCC) [I21.4]  Chronic bronchitis, unspecified chronic bronchitis type (HCC) [J42]  Chest pain, unspecified type [R07.9]    Subjective:    No acute events overnight. She was updated on tests obtained earlier in day. All questions answered.     ROS: denies fever, chills, cp, sob, n/v, HA unless stated above.       ARIPiprazole  2 mg Oral Daily    aspirin  81 mg Oral Daily    atorvastatin  20 mg Oral Nightly    clopidogrel  75 mg Oral Daily    famotidine  20 mg Oral Daily    predniSONE  10 mg Oral Daily    sodium chloride flush  5-40 mL IntraVENous 2 times per day     perflutren lipid microspheres, 1.5 mL, ONCE PRN  nitroGLYCERIN, 0.4 mg, Q5 Min PRN  sodium chloride flush, 5-40 mL, PRN  sodium chloride, , PRN  ondansetron, 4 mg, Q8H PRN   Or  ondansetron, 4 mg, Q6H PRN  melatonin, 3 mg, Nightly PRN  polyethylene glycol, 17 g, Daily PRN  acetaminophen, 650 mg, Q6H PRN   Or  acetaminophen, 650 mg, Q6H PRN         Objective:    BP (!) 89/59   Pulse 68   Temp 98.4 °F (36.9 °C) (Oral)   Resp 18   Ht 1.651 m (5' 5\")   Wt 48.9 kg (107 lb 12.8 oz)   SpO2 (!) 88%   BMI 17.94 kg/m²     General Appearance: alert and oriented to person, place and time and in no acute distress  Skin: warm and dry  Head: normocephalic and atraumatic  Eyes: pupils equal, round, and reactive to light, extraocular eye movements intact, conjunctivae normal  Neck: neck supple and non tender without mass   Pulmonary/Chest: clear to auscultation bilaterally- no wheezes, rales or rhonchi, normal air movement, no respiratory distress  Cardiovascular: normal rate, normal S1 and S2 and no carotid bruits  Abdomen: soft, non-tender, non-distended, normal bowel sounds, no masses or organomegaly  Extremities: no cyanosis, no clubbing and no edema  Neurologic:  Patient trasnferred to [facility] bed 318. Nursing report given to [facility]. SBAR discussed. Patient stable on Room air. Patient notified and in agreement of transfer. Wife notified via phone. Nurse accepts care of patient. I have no further concerns at this time. peptide  MARCIANO-creatinine 1.3 with baseline around 0.7.  Might be related to dehydration.  Will hydrate and monitor renal function closely  Lactic acidosis-likely due to dehydration.  Will hydrate and monitor  Leukocytosis-no sign of infection.  Might be reactive or due to steroid use.  She is on steroid due to Crohn's.  Continue steroids for now and monitor WBC  Crohn's disease-not in flareup currently.  She was constipated recently.  No bleeding.  She is on prednisone.  Will continue     Code Status: Full  DVT prophylaxis: lovenox        Electronically signed by Simon Pringle MD on 10/23/2024 at 2:51 PM

## 2024-10-23 NOTE — INTERVAL H&P NOTE
Update History & Physical    The patient's History and Physical of October 23, 24 was reviewed with the patient and I examined the patient. There was no change. The surgical site was confirmed by the patient and me.     Plan: The risks, benefits, expected outcome, and alternative to the recommended procedure have been discussed with the patient. Patient understands and wants to proceed with the procedure.     Electronically signed by Fred Avila DO on 10/23/2024 at 9:17 AM

## 2024-10-23 NOTE — BRIEF OP NOTE
Section of Cardiology  Adult Brief Cardiac Cath Procedure Note        Procedure(s):  LHC, b/l coronary angio    Pre-operative Diagnosis:  angina    H&P Status: Completed and reviewed.     Post-operative Diagnosis:      Right dominant coronary system  LV EF of 20% with severe global hypokinesis, LVEDP 8mmHg  LM normal   LAD dual LAD system. The lateral branch/D1 with50% mid stenosis  CX large caliber MLI  RCA large caliber, MLI    Findings:  See full report    Complications:  none    Primary Proceduralist:   Dr.Wes Avila DO      A:  Severe NICMP EF of 20%.   CAD out of proportion to CMP. Only D1 50%.     Plan    RFM  Max med rx  Avoid nephrotoxic agents  Consider cardiac rehab    Full procedure note to follow

## 2024-10-23 NOTE — PROGRESS NOTES
Comprehensive Nutrition Assessment    Type and Reason for Visit:  Initial, Positive Nutrition Screen (+ malnutrition screen)    Nutrition Recommendations/Plan:   Recommend general diet as tolerated     Malnutrition Assessment:  Malnutrition Status:  Insufficient data (10/23/24 1229)        Nutrition Assessment:    Unable to determine nutritional status at this time, pt was off floor for procedure at time of visit, BMI is low, but weight has been stable for the last year, has a hx of crohns, but no flare up at this time, recommend advancing to general diet post procedure    Nutrition Related Findings:    history of Crohn's disease presents with chest pain been having acid reflux for which she has been taking significant amount of Tums.  She mentioned that she had couple of episodes of nausea and vomiting.  No abdominal pain,, ;PMHx of asthma, COPD. Wound Type: None       Current Nutrition Intake & Therapies:    Average Meal Intake: NPO  Average Supplements Intake: None Ordered  ADULT DIET NPO     Anthropometric Measures:  Height: 165.1 cm (5' 5\")  Ideal Body Weight (IBW): 125 lbs (57 kg)    Admission Body Weight: 48.5 kg (107 lb)  Current Body Weight: 48.5 kg (107 lb), 85.6 % IBW. Weight Source: Bed Scale  Current BMI (kg/m2): 17.8  Usual Body Weight: 49.9 kg (110 lb) (( 10/2023))  % Weight Change (Calculated): -2.7                    BMI Categories: Underweight (BMI less than 18.5)    Estimated Daily Nutrient Needs:  Energy Requirements Based On: Kcal/kg  Weight Used for Energy Requirements: Current  Energy (kcal/day): 3114-4117 kcals @ 30 kcal/kg  Weight Used for Protein Requirements: Current  Protein (g/day): ~73 g protein @ 1.5 g/kg  Method Used for Fluid Requirements: 1 ml/kcal  Fluid (ml/day): 150    Nutrition Diagnosis:   Underweight related to other (comment) (unknown etiology) as evidenced by BMI    Nutrition Interventions:   Food and/or Nutrient Delivery: Start Oral Diet  Nutrition Education/Counseling:

## 2024-10-23 NOTE — CONSULTS
Consults    Patient Name: Jhoana London  Admit Date: 10/22/2024  7:27 PM  MR #: [id number]  : 1970    Attending Physician: Titus Altman MD  Reason for consult: NSTEMI    History of Presenting Illness:      Jhoana London is a 54 y.o. female on hospital day 1 with a history of .   History Obtained From:  patient, electronic medical record    Pt has h/o PAD and Crohn's presents with CP radiating to neck and upper back. She had nausea and did vomit. H&P and ER notes suggest she has CAD but she does not.  She has PAD and on DAPT for this reason. No prior cardiac Stents.    ECG SR 86 no acute changes noted.  HS Troponin 335 > 287.   She received ASA and Lovenox.  BP is borderline and no BB given.  She is more comfortable now and no ongoing CP.    CTA Chest no dissection nor PE.   History:      EKG:  Past Medical History:   Diagnosis Date    Smoker      No past surgical history on file.    Family History  No family history on file.  [] Unable to obtain due to ventilated and/ or neurologic status    Social History     Socioeconomic History    Marital status:      Spouse name: Not on file    Number of children: Not on file    Years of education: Not on file    Highest education level: Not on file   Occupational History    Not on file   Tobacco Use    Smoking status: Not on file    Smokeless tobacco: Not on file   Substance and Sexual Activity    Alcohol use: Not on file    Drug use: Not on file    Sexual activity: Not on file   Other Topics Concern    Not on file   Social History Narrative    Not on file     Social Determinants of Health     Financial Resource Strain: Medium Risk (2024)    Received from [facility]    Overall Financial Resource Strain (CARDIA)     Difficulty of Paying Living Expenses: Somewhat hard   Food Insecurity: No Food Insecurity (10/22/2024)    Hunger Vital Sign     Worried About Running Out of Food in the Last Year: Never true     Ran Out of Food in the Last Year: Never true  bronchiectasis, and partial airway filling mostly right lower lobe suggesting mucous. 4. A lesion in the right lower lobe and in the right upper lobe that are suggestive of areas of scarring, but each having a nonspecific nodular area, up to 1 cm size such that neoplasm is possible.  Per Fleischner society guidelines, consider a noncontrast chest CT at 3 months, a PET-CT, or tissue sampling.     XR CHEST (2 VW)    Result Date: 10/22/2024  EXAMINATION: TWO XRAY VIEWS OF THE CHEST 10/22/2024 7:05 pm COMPARISON: None. HISTORY: ORDERING SYSTEM PROVIDED HISTORY: SOB TECHNOLOGIST PROVIDED HISTORY: Reason for exam:->SOB What reading provider will be dictating this exam?->CRC FINDINGS: The lungs are hyperaerated without acute focal process.  There is no effusion or pneumothorax. The cardiomediastinal silhouette is without acute process. The osseous structures are without acute process.     No acute process. COPD.       Active Hospital Problems    Diagnosis Date Noted    Smoker [F17.200]      Priority: Low    NSTEMI (non-ST elevated myocardial infarction) (HCC) [I21.4] 10/22/2024     Priority: Low         Impression/Plan:   NSTEMI - ASA Statin. RBA Cath PCI discussed w pt.  Will proceed. Keep NPO. Hold Lovenox.   PAD - on DAPT.  Stop smoking  Crohn's  Hypotension - borderline BP  HPL - statin low fat diet.      Thank you for allowing us to participate in the care of this patient.     Will continue to follow.    Please call if questions or concerns arise.    Electronically signed by EVERT ALDRIDGE MD on 10/23/2024 at 7:33 AM

## 2024-10-23 NOTE — OR NURSING
Sedation Pre- Procedure Evaluation    Patient name: Jhoana London  YOB: 1970  MRN: [id number]   Allergies:   Patient has no known allergies.        Type Of Sedation  [x]local anesthesia  [x]moderate (conscious sedation)  []deep/analgesia      RN Focused History    Yes        No  N/A  [x]   []  Previous problem with airway anesthesia, sedation, or family history of the same    [x]   []  History Of tobacco, alcohol, or substance abuse     []   [x]  Problems associated with stridor, snoring, or sleep apnea    []   [] [x] Pediatric patient, history of premature birth or ventilator support (Moderate Sedation Only)     [x]   [] [] Moderate Sedation: Clear liquids within 6 hours of sedation and NPO within 2 hours    []   [] [x] Deep Sedation:Clear liquids within 6 hours of sedation and NPO within 2 hours      NPO since: 10/23/24 2359       Vitals, Cardiac Rhythm, Pain:   Vitals  Temp: 98.4 °F (36.9 °C)  Temp Source: Oral  Pulse: 72  Heart Rate Source: Monitor  Respirations: 22  BP: (!) 86/64  MAP (Calculated): 71  MAP (mmHg): 72  BP Location: Left upper arm  BP Upper/Lower: Upper  BP Method: Automatic  Patient Position: Supine  Cardiac Rhythm: Sinus rhythm  Pain Assessment  Pain Assessment: 0-10  Pain Level: 4  Pain Location: Chest      EKG:  EKG Interpretation: normal sinus rhythm.      Andreas Scale: Activity: Able to move four extremities voluntarily or on command  Respiration: Able to breath and cough freely  Circulation: Blood pressure within 20% of preanesthesia level  Consciousness: Fully awake  Oxygen: SAO2 > 90% with O2  Andreas Score: 9       Prior to Admission medications    Medication Sig Start Date End Date Taking? Authorizing Provider   ARIPiprazole (ABILIFY) 2 MG tablet Take 1 tablet by mouth daily 10/1/24  Yes Provider, MD Kash   ASPIRIN LOW DOSE 81 MG EC tablet Take 1 tablet by mouth daily   Yes ProviderKash MD   clopidogrel (PLAVIX) 75 MG tablet Take 1 tablet by mouth daily

## 2024-10-23 NOTE — PROGRESS NOTES
[facility]  Occupational Therapy        NAME:  Jhoana London  ROOM: [id number]  :  1970  DATE: 10/23/2024    Attempted to see Jhoana London at 1555 on this date for:   [x]  Initial Evaluation   []  Treatment       Patient was unable to be seen due to:   [] Off unit for testing/procedure    [x] Patient refused, stating that she didn't feel well. Concerns reported to nurse. Will re-attempt at earliest convenience.   [] Therapy on hold due to     [] Nursing deferred due to    [] Other:      Discussed with BINTA Matthew    Electronically signed by Mara Landers OT on 10/23/24 at 4:15 PM EDT

## 2024-10-23 NOTE — H&P
Hospitalist Group   History and Physical      CHIEF COMPLAINT: Chest pain    History of Present Illness:  54 y.o. female with a history of Crohn's disease presents with chest pain.  Patient said that yesterday started having chest pressure radiating to the neck and back.  Pain is worse when taking deep breath.  She was having shortness of breath because of the pain.  Has been feeling fatigue and weak for few days.  She also was having constipation that resolved after laxatives.  Patient also has been having acid reflux for which she has been taking significant amount of Tums.  She mentioned that she had couple of episodes of nausea and vomiting.  No abdominal pain, urinary symptoms.  No confusion.  Patient said that she takes vitamin D because of her Crohn's.  She said that she takes high-dose 50,000 a week and she is been doing that for a long time.    REVIEW OF SYSTEMS:  no fevers, chills, has cp, sob, has n/v, no ha, vision/hearing changes, wt changes, hot/cold flashes, other open skin lesions, diarrhea, constipation, dysuria/hematuria unless noted in HPI. Complete ROS performed with the patient and is otherwise negative.      PMH:  No past medical history on file.    Surgical History:  No past surgical history on file.    Medications Prior to Admission:    Prior to Admission medications    Medication Sig Start Date End Date Taking? Authorizing Provider   ARIPiprazole (ABILIFY) 2 MG tablet Take 1 tablet by mouth daily 10/1/24  Yes Kash Currie MD   ASPIRIN LOW DOSE 81 MG EC tablet Take 1 tablet by mouth daily   Yes Kash Currie MD   clopidogrel (PLAVIX) 75 MG tablet Take 1 tablet by mouth daily 12/16/23  Yes Kash Currie MD   famotidine (PEPCID) 40 MG tablet Take 1 tablet by mouth 2 times daily   Yes Kash Currie MD   LORazepam (ATIVAN) 1 MG tablet Take 1 tablet by mouth daily as needed. 9/24/24  Yes Kash Currie MD   predniSONE (DELTASONE) 10 MG tablet Take 1  tablet by mouth daily   Yes ProviderKash MD   atorvastatin (LIPITOR) 20 MG tablet Take 1 tablet by mouth nightly  Patient not taking: Reported on 10/22/2024 11/17/23 11/16/24  ProviderKash MD       Allergies:    Patient has no known allergies.    Social History:        Family History:   No family history on file.    PHYSICAL EXAM:  Vitals:  BP 90/64   Pulse 86   Temp 99.7 °F (37.6 °C) (Oral)   Resp 20   Ht 1.651 m (5' 5\")   Wt 48.9 kg (107 lb 12.8 oz)   SpO2 98%   BMI 17.94 kg/m²   General Appearance: alert and oriented to person, place and time   Pulmonary/Chest: clear to auscultation bilaterally- no wheezes   Cardiovascular: normal rate, regular rhythm, normal S1 and S2, no murmurs   Abdomen: soft, non-tender, non-distended, normal bowel sounds, no masses or organomegaly  Neurologic: reflexes normal and symmetric, no cranial nerve deficit        LABS:  Recent Labs     10/22/24  1828 10/22/24  2003     --    K 3.7  --    CL 97  --    CO2 28  --    BUN 20  --    CREATININE 1.31* 1.7*   GLUCOSE 130*  --    CALCIUM 11.2*  --        Recent Labs     10/22/24  1828 10/22/24  2003   WBC 24.0*  --    RBC 4.74  --    HGB 15.6 17.8*   HCT 43.9  --    MCV 92.6  --    MCH 32.9*  --    MCHC 35.5  --    RDW 13.8  --      --        Recent Labs     10/22/24  2003   POCGLU 142*       CBC with Differential:    Lab Results   Component Value Date/Time    WBC 24.0 10/22/2024 06:28 PM    RBC 4.74 10/22/2024 06:28 PM    HGB 17.8 10/22/2024 08:03 PM    HCT 43.9 10/22/2024 06:28 PM     10/22/2024 06:28 PM    MCV 92.6 10/22/2024 06:28 PM    MCH 32.9 10/22/2024 06:28 PM    MCHC 35.5 10/22/2024 06:28 PM    RDW 13.8 10/22/2024 06:28 PM    LYMPHOPCT 3.0 10/22/2024 06:28 PM    MONOPCT 3.8 10/22/2024 06:28 PM    EOSPCT 2.0 10/22/2024 06:28 PM    BASOPCT 0.3 10/22/2024 06:28 PM    MONOSABS 1.0 10/22/2024 06:28 PM    LYMPHSABS 1.0 10/22/2024 06:28 PM    EOSABS 0.5 10/22/2024 06:28 PM    BASOSABS 0.0

## 2024-10-23 NOTE — PROGRESS NOTES
Physical Therapy Missed Treatment   Facility/Department: [facility] MED SURG W173/W173-01    NAME: Jhoana London    : 1970 (54 y.o.)  MRN: [id number]    Account: [id number]  Gender: female      Attempted PT eval. Pt declined due to not feeling well and fatigued. Pt has not eaten well for the last few days. Nursing staff notified.      Will follow and attempt PT evaluation again at earliest availability.       Madai Tierney, PT, 10/23/24 at 4:01 PM

## 2024-10-24 LAB
ALBUMIN SERPL-MCNC: 2.7 G/DL (ref 3.5–4.6)
ALP SERPL-CCNC: 46 U/L (ref 40–130)
ALT SERPL-CCNC: 11 U/L (ref 0–33)
ANION GAP SERPL CALCULATED.3IONS-SCNC: 8 MEQ/L (ref 9–15)
AST SERPL-CCNC: 19 U/L (ref 0–35)
BASOPHILS # BLD: 0 K/UL (ref 0–0.2)
BASOPHILS NFR BLD: 0.1 %
BILIRUB SERPL-MCNC: 0.3 MG/DL (ref 0.2–0.7)
BUN SERPL-MCNC: 25 MG/DL (ref 6–20)
CALCIUM SERPL-MCNC: 8.3 MG/DL (ref 8.5–9.9)
CHLORIDE SERPL-SCNC: 100 MEQ/L (ref 95–107)
CO2 SERPL-SCNC: 26 MEQ/L (ref 20–31)
CREAT SERPL-MCNC: 1.18 MG/DL (ref 0.5–0.9)
EKG ATRIAL RATE: 86 BPM
EKG ATRIAL RATE: 99 BPM
EKG P AXIS: 63 DEGREES
EKG P AXIS: 71 DEGREES
EKG P-R INTERVAL: 122 MS
EKG P-R INTERVAL: 132 MS
EKG Q-T INTERVAL: 362 MS
EKG Q-T INTERVAL: 392 MS
EKG QRS DURATION: 104 MS
EKG QRS DURATION: 92 MS
EKG QTC CALCULATION (BAZETT): 433 MS
EKG QTC CALCULATION (BAZETT): 503 MS
EKG R AXIS: -10 DEGREES
EKG R AXIS: 25 DEGREES
EKG T AXIS: 76 DEGREES
EKG T AXIS: 80 DEGREES
EKG VENTRICULAR RATE: 86 BPM
EKG VENTRICULAR RATE: 99 BPM
EOSINOPHIL # BLD: 0.1 K/UL (ref 0–0.7)
EOSINOPHIL NFR BLD: 0.9 %
ERYTHROCYTE [DISTWIDTH] IN BLOOD BY AUTOMATED COUNT: 13.6 % (ref 11.5–14.5)
GLOBULIN SER CALC-MCNC: 2.3 G/DL (ref 2.3–3.5)
GLUCOSE SERPL-MCNC: 81 MG/DL (ref 70–99)
HCT VFR BLD AUTO: 38.5 % (ref 37–47)
HGB BLD-MCNC: 13.7 G/DL (ref 12–16)
LYMPHOCYTES # BLD: 2 K/UL (ref 1–4.8)
LYMPHOCYTES NFR BLD: 14.7 %
MAGNESIUM SERPL-MCNC: 2.1 MG/DL (ref 1.7–2.4)
MCH RBC QN AUTO: 32.8 PG (ref 27–31.3)
MCHC RBC AUTO-ENTMCNC: 35.6 % (ref 33–37)
MCV RBC AUTO: 92.1 FL (ref 79.4–94.8)
MONOCYTES # BLD: 1.2 K/UL (ref 0.2–0.8)
MONOCYTES NFR BLD: 9 %
NEUTROPHILS # BLD: 10.3 K/UL (ref 1.4–6.5)
NEUTS SEG NFR BLD: 74.9 %
PLATELET # BLD AUTO: 273 K/UL (ref 130–400)
POTASSIUM SERPL-SCNC: 3.9 MEQ/L (ref 3.4–4.9)
PROT SERPL-MCNC: 5 G/DL (ref 6.3–8)
RBC # BLD AUTO: 4.18 M/UL (ref 4.2–5.4)
SODIUM SERPL-SCNC: 134 MEQ/L (ref 135–144)
TSH REFLEX: 0.37 UIU/ML (ref 0.44–3.86)
WBC # BLD AUTO: 13.8 K/UL (ref 4.8–10.8)

## 2024-10-24 PROCEDURE — 85025 COMPLETE CBC W/AUTO DIFF WBC: CPT

## 2024-10-24 PROCEDURE — 6370000000 HC RX 637 (ALT 250 FOR IP): Performed by: INTERNAL MEDICINE

## 2024-10-24 PROCEDURE — 2580000003 HC RX 258: Performed by: INTERNAL MEDICINE

## 2024-10-24 PROCEDURE — 2700000000 HC OXYGEN THERAPY PER DAY

## 2024-10-24 PROCEDURE — 6370000000 HC RX 637 (ALT 250 FOR IP): Performed by: STUDENT IN AN ORGANIZED HEALTH CARE EDUCATION/TRAINING PROGRAM

## 2024-10-24 PROCEDURE — 94761 N-INVAS EAR/PLS OXIMETRY MLT: CPT

## 2024-10-24 PROCEDURE — 80053 COMPREHEN METABOLIC PANEL: CPT

## 2024-10-24 PROCEDURE — 93010 ELECTROCARDIOGRAM REPORT: CPT | Performed by: INTERNAL MEDICINE

## 2024-10-24 PROCEDURE — 99233 SBSQ HOSP IP/OBS HIGH 50: CPT | Performed by: INTERNAL MEDICINE

## 2024-10-24 PROCEDURE — 2060000000 HC ICU INTERMEDIATE R&B

## 2024-10-24 PROCEDURE — 83735 ASSAY OF MAGNESIUM: CPT

## 2024-10-24 PROCEDURE — 94640 AIRWAY INHALATION TREATMENT: CPT

## 2024-10-24 PROCEDURE — 84443 ASSAY THYROID STIM HORMONE: CPT

## 2024-10-24 PROCEDURE — 93005 ELECTROCARDIOGRAM TRACING: CPT | Performed by: INTERNAL MEDICINE

## 2024-10-24 PROCEDURE — 36415 COLL VENOUS BLD VENIPUNCTURE: CPT

## 2024-10-24 PROCEDURE — 2500000003 HC RX 250 WO HCPCS: Performed by: INTERNAL MEDICINE

## 2024-10-24 PROCEDURE — 6360000002 HC RX W HCPCS: Performed by: INTERNAL MEDICINE

## 2024-10-24 RX ORDER — POLYETHYLENE GLYCOL 3350 17 G/17G
17 POWDER, FOR SOLUTION ORAL 2 TIMES DAILY PRN
Qty: 850 G | Refills: 0 | Status: SHIPPED | OUTPATIENT
Start: 2024-10-24 | End: 2024-11-23

## 2024-10-24 RX ORDER — DILTIAZEM HYDROCHLORIDE 5 MG/ML
20 INJECTION INTRAVENOUS ONCE
Status: COMPLETED | OUTPATIENT
Start: 2024-10-24 | End: 2024-10-24

## 2024-10-24 RX ORDER — METOPROLOL SUCCINATE 25 MG/1
25 TABLET, EXTENDED RELEASE ORAL DAILY
Status: DISCONTINUED | OUTPATIENT
Start: 2024-10-24 | End: 2024-10-25 | Stop reason: HOSPADM

## 2024-10-24 RX ORDER — MAGNESIUM HYDROXIDE/ALUMINUM HYDROXICE/SIMETHICONE 120; 1200; 1200 MG/30ML; MG/30ML; MG/30ML
30 SUSPENSION ORAL EVERY 6 HOURS PRN
Status: DISCONTINUED | OUTPATIENT
Start: 2024-10-24 | End: 2024-10-25 | Stop reason: HOSPADM

## 2024-10-24 RX ORDER — METOPROLOL SUCCINATE 25 MG/1
25 TABLET, EXTENDED RELEASE ORAL DAILY
Qty: 30 TABLET | Refills: 3 | Status: SHIPPED | OUTPATIENT
Start: 2024-10-24

## 2024-10-24 RX ORDER — NITROGLYCERIN 0.4 MG/1
0.4 TABLET SUBLINGUAL EVERY 5 MIN PRN
Qty: 25 TABLET | Refills: 3 | Status: SHIPPED | OUTPATIENT
Start: 2024-10-24

## 2024-10-24 RX ORDER — ENOXAPARIN SODIUM 100 MG/ML
1 INJECTION SUBCUTANEOUS 2 TIMES DAILY
Status: DISCONTINUED | OUTPATIENT
Start: 2024-10-24 | End: 2024-10-25

## 2024-10-24 RX ORDER — POLYETHYLENE GLYCOL 3350 17 G/17G
17 POWDER, FOR SOLUTION ORAL DAILY
Status: DISCONTINUED | OUTPATIENT
Start: 2024-10-24 | End: 2024-10-25 | Stop reason: HOSPADM

## 2024-10-24 RX ORDER — MAGNESIUM HYDROXIDE/ALUMINUM HYDROXICE/SIMETHICONE 120; 1200; 1200 MG/30ML; MG/30ML; MG/30ML
30 SUSPENSION ORAL EVERY 6 HOURS PRN
Qty: 769 ML | Refills: 0 | Status: SHIPPED | OUTPATIENT
Start: 2024-10-24

## 2024-10-24 RX ORDER — SENNOSIDES A AND B 8.6 MG/1
1 TABLET, FILM COATED ORAL DAILY
Status: DISCONTINUED | OUTPATIENT
Start: 2024-10-24 | End: 2024-10-25 | Stop reason: HOSPADM

## 2024-10-24 RX ADMIN — IPRATROPIUM BROMIDE AND ALBUTEROL SULFATE 1 DOSE: 2.5; .5 SOLUTION RESPIRATORY (INHALATION) at 07:09

## 2024-10-24 RX ADMIN — SACUBITRIL AND VALSARTAN 1 TABLET: 24; 26 TABLET, FILM COATED ORAL at 22:10

## 2024-10-24 RX ADMIN — DILTIAZEM HYDROCHLORIDE 20 MG: 5 INJECTION, SOLUTION INTRAVENOUS at 12:04

## 2024-10-24 RX ADMIN — SODIUM CHLORIDE, PRESERVATIVE FREE 10 ML: 5 INJECTION INTRAVENOUS at 22:01

## 2024-10-24 RX ADMIN — ENOXAPARIN SODIUM 50 MG: 100 INJECTION SUBCUTANEOUS at 22:10

## 2024-10-24 RX ADMIN — ALUMINUM HYDROXIDE, MAGNESIUM HYDROXIDE, AND SIMETHICONE 30 ML: 1200; 120; 1200 SUSPENSION ORAL at 11:49

## 2024-10-24 RX ADMIN — SACUBITRIL AND VALSARTAN 1 TABLET: 24; 26 TABLET, FILM COATED ORAL at 09:23

## 2024-10-24 RX ADMIN — SODIUM CHLORIDE 10 MG/HR: 900 INJECTION, SOLUTION INTRAVENOUS at 12:00

## 2024-10-24 RX ADMIN — IPRATROPIUM BROMIDE AND ALBUTEROL SULFATE 1 DOSE: 2.5; .5 SOLUTION RESPIRATORY (INHALATION) at 18:59

## 2024-10-24 RX ADMIN — SODIUM CHLORIDE, PRESERVATIVE FREE 10 ML: 5 INJECTION INTRAVENOUS at 09:23

## 2024-10-24 RX ADMIN — ALUMINUM HYDROXIDE, MAGNESIUM HYDROXIDE, AND SIMETHICONE 30 ML: 1200; 120; 1200 SUSPENSION ORAL at 22:10

## 2024-10-24 RX ADMIN — PREDNISONE 10 MG: 10 TABLET ORAL at 09:23

## 2024-10-24 RX ADMIN — ARIPIPRAZOLE 2 MG: 2 TABLET ORAL at 09:23

## 2024-10-24 RX ADMIN — METOPROLOL SUCCINATE 25 MG: 25 TABLET, FILM COATED, EXTENDED RELEASE ORAL at 09:23

## 2024-10-24 RX ADMIN — SENNOSIDES 8.6 MG: 8.6 TABLET, FILM COATED ORAL at 11:49

## 2024-10-24 RX ADMIN — FAMOTIDINE 20 MG: 20 TABLET, FILM COATED ORAL at 09:23

## 2024-10-24 RX ADMIN — CLOPIDOGREL BISULFATE 75 MG: 75 TABLET ORAL at 09:23

## 2024-10-24 RX ADMIN — POLYETHYLENE GLYCOL 3350 17 G: 17 POWDER, FOR SOLUTION ORAL at 11:51

## 2024-10-24 RX ADMIN — ASPIRIN 81 MG: 81 TABLET, COATED ORAL at 09:23

## 2024-10-24 RX ADMIN — ENOXAPARIN SODIUM 50 MG: 100 INJECTION SUBCUTANEOUS at 11:45

## 2024-10-24 ASSESSMENT — PAIN SCALES - GENERAL
PAINLEVEL_OUTOF10: 0

## 2024-10-24 ASSESSMENT — ENCOUNTER SYMPTOMS
CHEST TIGHTNESS: 0
COUGH: 0
EYES NEGATIVE: 1
STRIDOR: 0
BLOOD IN STOOL: 0
NAUSEA: 1
SHORTNESS OF BREATH: 1
WHEEZING: 0

## 2024-10-24 NOTE — PROGRESS NOTES
[facility]  Occupational Therapy        NAME:  Jhoana London  ROOM: W173/W173-01  :  1970  DATE: 10/24/2024    Attempted to see Jhoana London at 13:19 on this date for:   [x]  Initial Evaluation   []  Treatment       Patient was unable to be seen due to:   [] Off unit for testing/procedure    [] Patient refused, stating \"    [] Therapy on hold due to     [x] Nursing deferred due to pt with new onset of AFIB.    [] Other:      Discussed with BINTA Oconnor. Will attempt OT eval at another time.     Electronically signed by GIGI Romero/L on 10/24/24 at 1:19 PM EDT

## 2024-10-24 NOTE — PROGRESS NOTES
[facility] Hospitalist Progress Note    Admitting Date and Time: 10/22/2024  7:27 PM  Admit Dx: Lung nodule [R91.1]  Elevated troponin [R79.89]  NSTEMI (non-ST elevated myocardial infarction) (HCC) [I21.4]  Chronic bronchitis, unspecified chronic bronchitis type (HCC) [J42]  Chest pain, unspecified type [R07.9]    Subjective:    No acute events overnight. Patient this morning during my evaluation only reporting gas pains but we were planning on dc. Later RN notified me of HR jumping to 150s-170s, now patient is on dilt gtt     ROS: denies fever, chills, cp, sob, n/v, HA unless stated above.       sacubitril-valsartan  1 tablet Oral BID    metoprolol succinate  25 mg Oral Daily    senna  1 tablet Oral Daily    polyethylene glycol  17 g Oral Daily    enoxaparin  1 mg/kg SubCUTAneous BID    ipratropium 0.5 mg-albuterol 2.5 mg  1 Dose Inhalation BID    ARIPiprazole  2 mg Oral Daily    aspirin  81 mg Oral Daily    atorvastatin  20 mg Oral Nightly    clopidogrel  75 mg Oral Daily    famotidine  20 mg Oral Daily    predniSONE  10 mg Oral Daily    sodium chloride flush  5-40 mL IntraVENous 2 times per day     aluminum & magnesium hydroxide-simethicone, 30 mL, Q6H PRN  perflutren lipid microspheres, 1.5 mL, ONCE PRN  ipratropium 0.5 mg-albuterol 2.5 mg, 1 Dose, Q4H PRN  nitroGLYCERIN, 0.4 mg, Q5 Min PRN  sodium chloride flush, 5-40 mL, PRN  sodium chloride, , PRN  ondansetron, 4 mg, Q8H PRN   Or  ondansetron, 4 mg, Q6H PRN  melatonin, 3 mg, Nightly PRN  polyethylene glycol, 17 g, Daily PRN  acetaminophen, 650 mg, Q6H PRN   Or  acetaminophen, 650 mg, Q6H PRN         Objective:    /70   Pulse (!) 159   Temp 98.4 °F (36.9 °C) (Oral)   Resp 20   Ht 1.651 m (5' 5\")   Wt 51.9 kg (114 lb 6.4 oz)   SpO2 97%   BMI 19.04 kg/m²     General Appearance: alert and oriented to person, place and time and in no acute distress  Skin: warm and dry  Head: normocephalic and atraumatic  Eyes: pupils equal, round, and  CAD on dual antiplatelets.  Chest pain radiating to the neck and back.  CTA negative for PE or dissection.  Troponin elevated but no baseline will recheck troponin.  S/p LHC showing EF 20%, no PCI indicated based on mild degree of CAD.   HFrEF - discussed with cardiologist, will optimize GDMT as BP allows. BP low today limiting this optmization. TTE pending  Hypercalcemia-calcium 11.2.  Patient takes vitamin D high-dose 50,000 weekly for a long time according to her.  Will check vitamin D level, returned wnl.  IV fluids.  Monitor calcium closely.  Will check PTH and PTH related peptide. Will dc w/ [facility]  Afib w/ RVR - HR jumping to 150s-170s in afib w/ RVR 10/24. Now on dilt gtt, sq lovenox   MARCIANO-creatinine 1.3 with baseline around 0.7.  Might be related to dehydration.  Will hydrate and monitor renal function closely  Lactic acidosis-likely due to dehydration.  Will hydrate and monitor  Leukocytosis-no sign of infection.  Might be reactive or due to steroid use.  She is on steroid due to Crohn's.  Continue steroids for now and monitor WBC  Crohn's disease-not in flareup currently.  She was constipated recently.  No bleeding.  She is on prednisone.  Will continue     Code Status: Full  DVT prophylaxis: lovenox        Electronically signed by Simon Pringle MD on 10/24/2024 at 12:19 PM

## 2024-10-24 NOTE — PROGRESS NOTES
CLINICAL PHARMACY NOTE: MEDS TO BEDS    Total # of Prescriptions Filled: 3   The following medications were delivered to the patient:  Nitro 0.4 mg Tab  Metoprolol Succ Er 25 mg Tab  Entresto 24-26 mg Tab    Additional Documentation:

## 2024-10-24 NOTE — FLOWSHEET NOTE
1130- Monitor room called and stated patient's heart rate is 150-170. EKG completed. Patient denies any chest pain or shortness of breath. Patient does state she can feel it fluttering. Spoke with DR. Jeffries and made him aware. Dr. Jeffries reviewed the EKG and states he is putting orders in epic.Electronically signed by Brie Beatty RN on 10/24/2024 at 11:35 AM  1135- Pharmacy called and asked to send the cardizem drip and bolus ASAP due to patient's heart rate 150's.  1208- Cardizem bolus and cardizem drip sent up from pharmacy and hung as ordered. Patient's heart rate still in the 150's at this time.Electronically signed by Brie Beatty RN on 10/24/2024 at 12:12 PM  1437- Cardizem drip decreased to 7.5 mg/hr. Heart rate 68, BP 98/51.  1530-Cardizem drip decreased to 5 mg/hr. /62 heart rate 73 SR.  1538- Cardizem drip placed on hold and Dr. Medellin messaged regarding patient's BP 97/57- left leg, 87/55-left arm. Telemetry still SR 64.Electronically signed by Brie Beatty RN on 10/24/2024 at 5:59 PM

## 2024-10-24 NOTE — CARE COORDINATION
SPOKE WITH VIRIDIANA, NIKOLAY KRAUSEVESLUC, INSURANCE IS REQUESTING MEDICAL NECESSITY FORM, VIRIDIANA TO SEND TO DR ALDRIDGE'S OFFICE.

## 2024-10-24 NOTE — PROGRESS NOTES
10/24/24 0800   RT Protocol   History Pulmonary Disease 2   Respiratory pattern 0   Breath sounds 2   Cough 0   Indications for Bronchodilator Therapy On home bronchodilators   Bronchodilator Assessment Score 4

## 2024-10-24 NOTE — FLOWSHEET NOTE
0955- Oxygen saturation 97 % on 2L nasal cannula. Oxygen removed. Oxygen Saturation maintained at 97 % with oxygen off.  1041- Oxygen saturation 94 % on room air.Electronically signed by Brie Beatty RN on 10/24/2024 at 10:44 AM

## 2024-10-24 NOTE — PROGRESS NOTES
PROGRESS NOTE    Patient Name: Jhoana London  Admit Date: 10/22/2024  7:27 PM  MR #: [id number]  : 1970    Attending Physician: Simon Pringle MD  Reason for consult: NSTEMI    History of Presenting Illness:      Jhoana London is a 54 y.o. female on hospital day 2 with a history of .   History Obtained From:  patient, electronic medical record    Pt has h/o PAD and Crohn's presents with CP radiating to neck and upper back. She had nausea and did vomit. H&P and ER notes suggest she has CAD but she does not.  She has PAD and on DAPT for this reason. No prior cardiac Stents.    ECG SR 86 no acute changes noted.  HS Troponin 335 > 287.   She received ASA and Lovenox.  BP is borderline and no BB given.  She is more comfortable now and no ongoing CP.    CTA Chest no dissection nor PE.     10/24/24 Tele SR 78 no cp no sob. Feels gaseous and can't eat.  Has not eaten since Saturday per pt.   History:      EKG:  Past Medical History:   Diagnosis Date    Smoker      History reviewed. No pertinent surgical history.    Family History  History reviewed. No pertinent family history.  [] Unable to obtain due to ventilated and/ or neurologic status    Social History     Socioeconomic History    Marital status:      Spouse name: Not on file    Number of children: Not on file    Years of education: Not on file    Highest education level: Not on file   Occupational History    Not on file   Tobacco Use    Smoking status: Not on file    Smokeless tobacco: Not on file   Substance and Sexual Activity    Alcohol use: Not on file    Drug use: Not on file    Sexual activity: Not on file   Other Topics Concern    Not on file   Social History Narrative    Not on file     Social Determinants of Health     Financial Resource Strain: Medium Risk (2024)    Received from [facility]    Overall Financial Resource Strain (CARDIA)     Difficulty of Paying Living Expenses: Somewhat hard   Food Insecurity: No Food Insecurity

## 2024-10-24 NOTE — PROGRESS NOTES
10/23/24 2100   RT Protocol   History Pulmonary Disease 2   Respiratory pattern 0   Breath sounds 2   Cough 0   Indications for Bronchodilator Therapy On home bronchodilators   Bronchodilator Assessment Score 4

## 2024-10-24 NOTE — PROGRESS NOTES
Physical Therapy Missed Treatment   Facility/Department: [facility] MED SURG W173/W173-01    NAME: Jhoana London    : 1970 (54 y.o.)  MRN: [id number]    Account: [id number]  Gender: female      Pt with new Afib this date. Nursing requests eval delay as result       Will follow and attempt PT evaluation again at earliest availability.       Madai Tierney, PT, 10/24/24 at 1:11 PM

## 2024-10-25 VITALS
TEMPERATURE: 97.9 F | DIASTOLIC BLOOD PRESSURE: 76 MMHG | HEART RATE: 93 BPM | RESPIRATION RATE: 16 BRPM | HEIGHT: 65 IN | SYSTOLIC BLOOD PRESSURE: 100 MMHG | OXYGEN SATURATION: 96 % | WEIGHT: 114.4 LBS | BODY MASS INDEX: 19.06 KG/M2

## 2024-10-25 DIAGNOSIS — K50.919 CROHN'S DISEASE WITH COMPLICATION, UNSPECIFIED GASTROINTESTINAL TRACT LOCATION: Primary | ICD-10-CM

## 2024-10-25 LAB
ALBUMIN SERPL-MCNC: 2.7 G/DL (ref 3.5–4.6)
ALP SERPL-CCNC: 43 U/L (ref 40–130)
ALT SERPL-CCNC: 11 U/L (ref 0–33)
ANION GAP SERPL CALCULATED.3IONS-SCNC: 8 MEQ/L (ref 9–15)
AST SERPL-CCNC: 13 U/L (ref 0–35)
BASOPHILS # BLD: 0 K/UL (ref 0–0.2)
BASOPHILS NFR BLD: 0.2 %
BILIRUB SERPL-MCNC: 0.3 MG/DL (ref 0.2–0.7)
BUN SERPL-MCNC: 24 MG/DL (ref 6–20)
CALCIUM SERPL-MCNC: 8.1 MG/DL (ref 8.5–9.9)
CHLORIDE SERPL-SCNC: 99 MEQ/L (ref 95–107)
CO2 SERPL-SCNC: 26 MEQ/L (ref 20–31)
CREAT SERPL-MCNC: 0.84 MG/DL (ref 0.5–0.9)
EOSINOPHIL # BLD: 0.2 K/UL (ref 0–0.7)
EOSINOPHIL NFR BLD: 1.7 %
ERYTHROCYTE [DISTWIDTH] IN BLOOD BY AUTOMATED COUNT: 13.5 % (ref 11.5–14.5)
GLOBULIN SER CALC-MCNC: 2.2 G/DL (ref 2.3–3.5)
GLUCOSE SERPL-MCNC: 100 MG/DL (ref 70–99)
HCT VFR BLD AUTO: 40.4 % (ref 37–47)
HGB BLD-MCNC: 14 G/DL (ref 12–16)
LYMPHOCYTES # BLD: 2.3 K/UL (ref 1–4.8)
LYMPHOCYTES NFR BLD: 18.6 %
MAGNESIUM SERPL-MCNC: 2.1 MG/DL (ref 1.7–2.4)
MCH RBC QN AUTO: 31.7 PG (ref 27–31.3)
MCHC RBC AUTO-ENTMCNC: 34.7 % (ref 33–37)
MCV RBC AUTO: 91.4 FL (ref 79.4–94.8)
MONOCYTES # BLD: 1.1 K/UL (ref 0.2–0.8)
MONOCYTES NFR BLD: 8.9 %
NEUTROPHILS # BLD: 8.8 K/UL (ref 1.4–6.5)
NEUTS SEG NFR BLD: 70.2 %
PLATELET # BLD AUTO: 286 K/UL (ref 130–400)
POTASSIUM SERPL-SCNC: 3.9 MEQ/L (ref 3.4–4.9)
PROT SERPL-MCNC: 4.9 G/DL (ref 6.3–8)
RBC # BLD AUTO: 4.42 M/UL (ref 4.2–5.4)
SODIUM SERPL-SCNC: 133 MEQ/L (ref 135–144)
WBC # BLD AUTO: 12.5 K/UL (ref 4.8–10.8)

## 2024-10-25 PROCEDURE — 97165 OT EVAL LOW COMPLEX 30 MIN: CPT

## 2024-10-25 PROCEDURE — 80053 COMPREHEN METABOLIC PANEL: CPT

## 2024-10-25 PROCEDURE — 6370000000 HC RX 637 (ALT 250 FOR IP): Performed by: INTERNAL MEDICINE

## 2024-10-25 PROCEDURE — 2580000003 HC RX 258: Performed by: INTERNAL MEDICINE

## 2024-10-25 PROCEDURE — 97161 PT EVAL LOW COMPLEX 20 MIN: CPT

## 2024-10-25 PROCEDURE — 99233 SBSQ HOSP IP/OBS HIGH 50: CPT | Performed by: INTERNAL MEDICINE

## 2024-10-25 PROCEDURE — 36415 COLL VENOUS BLD VENIPUNCTURE: CPT

## 2024-10-25 PROCEDURE — 6370000000 HC RX 637 (ALT 250 FOR IP): Performed by: STUDENT IN AN ORGANIZED HEALTH CARE EDUCATION/TRAINING PROGRAM

## 2024-10-25 PROCEDURE — 83735 ASSAY OF MAGNESIUM: CPT

## 2024-10-25 PROCEDURE — 85025 COMPLETE CBC W/AUTO DIFF WBC: CPT

## 2024-10-25 PROCEDURE — 94640 AIRWAY INHALATION TREATMENT: CPT

## 2024-10-25 PROCEDURE — 94761 N-INVAS EAR/PLS OXIMETRY MLT: CPT

## 2024-10-25 PROCEDURE — 6360000002 HC RX W HCPCS: Performed by: INTERNAL MEDICINE

## 2024-10-25 PROCEDURE — 2700000000 HC OXYGEN THERAPY PER DAY

## 2024-10-25 RX ORDER — METOCLOPRAMIDE HYDROCHLORIDE 5 MG/ML
10 INJECTION INTRAMUSCULAR; INTRAVENOUS EVERY 6 HOURS PRN
Status: DISCONTINUED | OUTPATIENT
Start: 2024-10-25 | End: 2024-10-25 | Stop reason: HOSPADM

## 2024-10-25 RX ORDER — ALBUTEROL SULFATE 90 UG/1
2 INHALANT RESPIRATORY (INHALATION) 4 TIMES DAILY PRN
Qty: 18 G | Refills: 0 | Status: SHIPPED | OUTPATIENT
Start: 2024-10-25

## 2024-10-25 RX ADMIN — ALUMINUM HYDROXIDE, MAGNESIUM HYDROXIDE, AND SIMETHICONE 30 ML: 1200; 120; 1200 SUSPENSION ORAL at 15:26

## 2024-10-25 RX ADMIN — SODIUM CHLORIDE, PRESERVATIVE FREE 10 ML: 5 INJECTION INTRAVENOUS at 09:29

## 2024-10-25 RX ADMIN — CLOPIDOGREL BISULFATE 75 MG: 75 TABLET ORAL at 09:29

## 2024-10-25 RX ADMIN — APIXABAN 5 MG: 5 TABLET, FILM COATED ORAL at 09:29

## 2024-10-25 RX ADMIN — SACUBITRIL AND VALSARTAN 1 TABLET: 24; 26 TABLET, FILM COATED ORAL at 09:28

## 2024-10-25 RX ADMIN — METOPROLOL SUCCINATE 25 MG: 25 TABLET, FILM COATED, EXTENDED RELEASE ORAL at 09:28

## 2024-10-25 RX ADMIN — IPRATROPIUM BROMIDE AND ALBUTEROL SULFATE 1 DOSE: 2.5; .5 SOLUTION RESPIRATORY (INHALATION) at 07:22

## 2024-10-25 RX ADMIN — ARIPIPRAZOLE 2 MG: 2 TABLET ORAL at 09:29

## 2024-10-25 RX ADMIN — METOCLOPRAMIDE HYDROCHLORIDE 10 MG: 5 INJECTION INTRAMUSCULAR; INTRAVENOUS at 13:17

## 2024-10-25 RX ADMIN — IPRATROPIUM BROMIDE AND ALBUTEROL SULFATE 1 DOSE: 2.5; .5 SOLUTION RESPIRATORY (INHALATION) at 00:06

## 2024-10-25 RX ADMIN — ONDANSETRON 4 MG: 4 TABLET, ORALLY DISINTEGRATING ORAL at 09:35

## 2024-10-25 RX ADMIN — PREDNISONE 10 MG: 10 TABLET ORAL at 09:29

## 2024-10-25 RX ADMIN — FAMOTIDINE 20 MG: 20 TABLET, FILM COATED ORAL at 09:29

## 2024-10-25 ASSESSMENT — ENCOUNTER SYMPTOMS
SHORTNESS OF BREATH: 1
NAUSEA: 1
COUGH: 0
STRIDOR: 0
BLOOD IN STOOL: 0
WHEEZING: 0
EYES NEGATIVE: 1
CHEST TIGHTNESS: 0

## 2024-10-25 ASSESSMENT — PAIN SCALES - GENERAL
PAINLEVEL_OUTOF10: 0

## 2024-10-25 NOTE — PROGRESS NOTES
10/25/24 1037   Resting (Room Air)   SpO2 93   HR 98   During Walk (Room Air)   SpO2 85      Walk/Assistance Device Ambulation   Rate of Dyspnea 1   Symptoms Dizziness;Leg pain   During Walk (On O2)   SpO2 94      O2 Device Nasal cannula   O2 Flow Rate (l/min) 3 l/min   Need Additional O2 Flow Rate Rows No   Walk/Assistance Device Ambulation   Rate of Dyspnea 1   Symptoms Leg pain   After Walk   SpO2 95      O2 Device Other (comment)  (RA)   Rate of Dyspnea 0   Does the Patient Qualify for Home O2 Yes   Liter Flow at Rest 0   Liter Flow on Exertion 3   Does the Patient Need Portable Oxygen Tanks Yes

## 2024-10-25 NOTE — PROGRESS NOTES
10/24/24 1859   RT Protocol   History Pulmonary Disease 2   Respiratory pattern 0   Breath sounds 2   Cough 0   Indications for Bronchodilator Therapy On home bronchodilators   Bronchodilator Assessment Score 4

## 2024-10-25 NOTE — PROGRESS NOTES
Physical Therapy Med Surg Initial Assessment  Facility/Department: [address] TELEMETRY  Room: [place] [address]       NAME: Jhoana London  : 1970 (54 y.o.)  MRN: [id number]  CODE STATUS: Full Code    Date of Service: 10/25/2024    Patient Diagnosis(es): Lung nodule [R91.1]  Elevated troponin [R79.89]  NSTEMI (non-ST elevated myocardial infarction) ([facility]) [I21.4]  Chronic bronchitis, unspecified chronic bronchitis type ([facility]) [J42]  Chest pain, unspecified type [R07.9]   Chief Complaint   Patient presents with    Chest Pain     x2days    Neck Pain    Back Pain     Patient Active Problem List    Diagnosis Date Noted    Elevated troponin 10/23/2024    Smoker     NSTEMI (non-ST elevated myocardial infarction) ([facility]) 10/22/2024        Past Medical History:   Diagnosis Date    Smoker      History reviewed. No pertinent surgical history.    Chart Reviewed: Yes  Patient assessed for rehabilitation services?: Yes  Family / Caregiver Present: No  Diagnosis: NSTEMI (non-ST elevated myocardial infarction) ([facility])  General Comment  Comments: Pt resting in bed - agreeable to PT evaluation    Restrictions:  Restrictions/Precautions: Fall Risk, Up as Tolerated     SUBJECTIVE:   Subjective: \"I get up and walk ok.\"    Pain  Pain: Denies pre and post session pain.    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

## 2024-10-25 NOTE — PROGRESS NOTES
10/25/24 0900   RT Protocol   History Pulmonary Disease 2   Respiratory pattern 0   Breath sounds 2   Cough 0   Indications for Bronchodilator Therapy Decreased or absent breath sounds   Bronchodilator Assessment Score 4

## 2024-10-25 NOTE — CARE COORDINATION
PLAN FOR DC HOME TODAY ONCE [facility] APPROVED AND FITTED.  FAXED MED LIST TO [facility] L[facility] AS REQUESTED.  PT WILL ALSO NEED HOME O2 EVAL

## 2024-10-25 NOTE — CARE COORDINATION
L[facility]EV[facility] APPROVED, AWAITING FITTING TIME.  STILL AWAITING APPROVAL FOR O2    1230  O2 APPROVED, RN  TO GIVE SEQUEL AT TIME OF DC.  RN AWARE.  STILL AWAITING FITTING TIME.      1300  LIFEVEST FITTER TO BE HERE AROUND 1415

## 2024-10-25 NOTE — PROGRESS NOTES
Pt is in bed resting comfortably and watching Tv. He is A/O/x4, Independent. He takes his po medications whole w/ water. HS BS at 137, no SSIC needed, but received 18 units of Lantus. Last BM on 10/24/2024, no c/o constipation, abd pain, or diarrhea. Bilat. Lungs are clr, POX on RA at 94%, no c/o SOB or difficulties breathing. Pt has a 22 ga IV in her Right arm, that is clean, dry, and intact. Bilat UE/LE pulses are palpable at +2. No c/o pain at this time. Call light is w/in reach.

## 2024-10-25 NOTE — PROGRESS NOTES
[facility] OCCUPATIONAL THERAPY EVALUATION - ACUTE     NAME: Jhoana London  : 1970 (54 y.o.)  MRN: [id number]  CODE STATUS: Full Code  Room: [facility] [address]    Date of Service: 10/25/2024    Patient Diagnosis(es): Lung nodule [R91.1]  Elevated troponin [R79.89]  NSTEMI (non-ST elevated myocardial infarction) ([facility]) [I21.4]  Chronic bronchitis, unspecified chronic bronchitis type (HCC) [J42]  Chest pain, unspecified type [R07.9]   Patient Active Problem List    Diagnosis Date Noted    Elevated troponin 10/23/2024    Smoker     NSTEMI (non-ST elevated myocardial infarction) (HCC) 10/22/2024        Past Medical History:   Diagnosis Date    Smoker      History reviewed. No pertinent surgical history.     Restrictions  Restrictions/Precautions: Fall Risk, Up as Tolerated     Safety Devices: Safety Devices  Type of Devices: All fall risk precautions in place;Left in bed;Call light within reach;Bed alarm in place     Patient's date of birth confirmed: Yes    General:  Chart Reviewed: Yes  Patient assessed for rehabilitation services?: Yes    Subjective  Subjective: \"I feel ready to go home\"       Pain at start of treatment: No    Pain at end of treatment: No    Location:   Description:   Nursing notified: Not Applicable  RN:   Intervention: None    Prior Level of Function:  Social/Functional History  Lives With: Son  Type of Home: House  Home Layout: Two level, Able to Live on Main level with bedroom/bathroom (bed/bath on main floor)  Home Access: Stairs to enter with rails  Entrance Stairs - Number of Steps: 3-4  Entrance Stairs - Rails: Right  Bathroom Shower/Tub: Tub/Shower unit  Bathroom Equipment: None  Home Equipment: None  ADL Assistance: Independent  Homemaking Assistance: Independent  Ambulation Assistance: Independent (no AD)  Transfer Assistance: Independent  Active : Yes  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)    OBJECTIVE:     Orientation  Intact  Standing: Intact    Functional Endurance:  Activity Tolerance  Activity Tolerance: Patient Tolerated treatment well    D/C Recommendations:  OT D/C RECOMMENDATIONS  REQUIRES OT FOLLOW-UP: No    Equipment Recommendations:  OT Equipment Recommendations  Equipment Needed: No    OT Education:   Patient Education  Education Given To: Patient  Education Provided: Role of Therapy;Plan of Care  Education Method: Verbal  Barriers to Learning: None  Education Outcome: Verbalized understanding    OT Follow Up:   OT D/C RECOMMENDATIONS  REQUIRES OT FOLLOW-UP: No       Assessment/Discharge Disposition:  Assessment: Pt is a 54 year old woman from home who presents to [facility] with NSTEMI. She demonstrates no significant functional deficits and requires no physical assist. No acute OT needs identified.  Prognosis: Good  No Skilled OT: Independent with ADL's, Safe to return home  Decision Making: Low Complexity     History: Pt's medical history is moderately complex  Exam: Pt has no performance deficits  Assistance / Modification: Pt requires no physical assist    AMPAC (Six Click) Self care Score   How much help is needed for putting on and taking off regular lower body clothing?: None  How much help is needed for bathing (which includes washing, rinsing, drying)?: None  How much help is needed for toileting (which includes using toilet, bedpan, or urinal)?: None  How much help is needed for putting on and taking off regular upper body clothing?: None  How much help is needed for taking care of personal grooming?: None  How much help for eating meals?: None  AM-[facility] Inpatient Daily Activity Raw Score: 24  AM-[facility] Inpatient ADL T-Scale Score : 57.54  ADL Inpatient CMS 0-100% Score: 0    Therapy key for assistance levels -   Independent/Mod I = Pt. is able to perform task with no assistance but may require a device   Stand by assistance = Pt. does not perform task at an independent level but does not need physical assistance,

## 2024-10-25 NOTE — PLAN OF CARE
See OT evaluation for all goals and OT POC. Electronically signed by Karina Andrews OTR/L on 10/25/2024 at 12:54 PM

## 2024-10-25 NOTE — DISCHARGE INSTR - DIET
Good nutrition is important when healing from an illness, injury, or surgery.  Follow any nutrition recommendations given to you during your hospital stay.   If you were given an oral nutrition supplement while in the hospital, continue to take this supplement at home.  You can take it with meals, in-between meals, and/or before bedtime. These supplements can be purchased at most local grocery stores, pharmacies, and chain [employer]-stores.   If you have any questions about your diet or nutrition, call the hospital and ask for the dietitian.    Heart healthy, low sodium diet.

## 2024-10-25 NOTE — PROGRESS NOTES
Hospitalist Progress Note      PCP: None, None    Date of Admission: 10/22/2024    Chief Complaint:  no acute events, afebrile, stable HD, feels nauseous, not eating per nursing staff    Medications:  Reviewed    Infusion Medications    sodium chloride       Scheduled Medications    apixaban  5 mg Oral BID    sacubitril-valsartan  1 tablet Oral BID    metoprolol succinate  25 mg Oral Daily    senna  1 tablet Oral Daily    polyethylene glycol  17 g Oral Daily    ipratropium 0.5 mg-albuterol 2.5 mg  1 Dose Inhalation BID    ARIPiprazole  2 mg Oral Daily    atorvastatin  20 mg Oral Nightly    clopidogrel  75 mg Oral Daily    famotidine  20 mg Oral Daily    predniSONE  10 mg Oral Daily    sodium chloride flush  5-40 mL IntraVENous 2 times per day     PRN Meds: metoclopramide, aluminum & magnesium hydroxide-simethicone, perflutren lipid microspheres, ipratropium 0.5 mg-albuterol 2.5 mg, nitroGLYCERIN, sodium chloride flush, sodium chloride, ondansetron **OR** ondansetron, melatonin, polyethylene glycol, acetaminophen **OR** acetaminophen      Intake/Output Summary (Last 24 hours) at 10/25/2024 1328  Last data filed at 10/24/2024 1816  Gross per 24 hour   Intake 220 ml   Output --   Net 220 ml       Exam:    /76   Pulse 90   Temp 97.9 °F (36.6 °C) (Oral)   Resp 16   Ht 1.651 m (5' 5\")   Wt 51.9 kg (114 lb 6.4 oz)   SpO2 96%   BMI 19.04 kg/m²     General appearance: appears stated age and cooperative.  Respiratory:  clear to auscultation, bilaterally without Rales/Wheezes/Rhonchi.  Cardiovascular: Regular rate and rhythm with normal S1/S2 without murmurs, rubs or gallops.  Abdomen: Soft, non-tender, non-distended with normal bowel sounds.  Musculoskeletal: No clubbing, cyanosis or edema bilaterally.     Labs:   Recent Labs     10/23/24  0519 10/24/24  0500 10/25/24  0543   WBC 18.3* 13.8* 12.5*   HGB 14.0 13.7 14.0   HCT 41.1 38.5 40.4    273 286     Recent Labs     10/23/24  0519 10/24/24  0500

## 2024-10-25 NOTE — DISCHARGE SUMMARY
Hospital Medicine Discharge Summary    Jhoana London  :  1970  MRN:  [id number]    Admit date:  10/22/2024  Discharge date:  10/25/2024    Admitting Physician:  Titus Altman MD  Primary Care Physician:  None, None      Discharge Diagnoses:    Principal Problem:    NSTEMI (non-ST elevated myocardial infarction) ([facility])  Active Problems:    Elevated troponin    Smoker  Resolved Problems:    * No resolved hospital problems. *      Hospital Course:   Jhoana London is a 54 y.o. female that was admitted and treated at [facility] for the following medical issues:     NSTEMI  - LHC showed nonobstructive disease  - TTE showed LVEF of 20-25% with global hypokinesis  - started on Toprol, Entresto  - continued Plavix, Lipitor  - stopped ASA due to Apixaban being added for PAF  - followed by cardiology    New onset PAF with RVR  - requiring Cardizem infusion  - converted to SR  - started on Apixaban    Hypoxia   - CTA of the chest was negative for PE, it showed chronic lung disease, bronchiectasis, partial airway filling mostly right lower lobe suggesting mucous and RUL/RLL lesions  - qualified for 3 liters of O2 with exertion per RT  - still smokes tobacco  - continued Albuterol inhaler   - follow up with PCP and /or pulmonology as outpatient    MARCIANO  - improved    Leukocytosis   - improved    Hypercalcemia   - improved     Crohn's disease  - continued Prednisone        Disposition - home with Lifevest      Patient was seen by the following consultants while admitted to [facility]:   Consults:  None    Significant Diagnostic Studies:    Echo (TTE) complete (PRN contrast/bubble/strain/3D)    Result Date: 10/23/2024    Left Ventricle: Severely reduced left ventricular systolic function with a visually estimated EF of 20 - 25%. Left ventricle is mildly dilated. Normal wall thickness. Severe global hypokinesis present. Abnormal diastolic function.   Image quality is adequate. Contrast used:  - F [id number] [address] [facility] [place] [address]      Phone: [phone]   aluminum & magnesium hydroxide-simethicone 200-200-20 MG/5ML Susp suspension  polyethylene glycol 17 GM/SCOOP powder         Disposition:   Discharged to Home. Any [facility] needs that were indicated and/or required as been addressed and set up by Social Work.     Condition at discharge: Pt was medically stable at the time of discharge. Significant improvement in clinical condition compared to initial condition at presentation to hospital    Activity: activity as tolerated, fall precautions.     Total time taken for discharging this patient: 40 minutes. Greater than 70% of time was spent focused exclusively on this patient. Time was taken to review chart, discuss plans with consultants, reconciling medications, discussing plan answering questions with patient.     Signed:  BOLA FORD MD  10/25/2024, 2:08 PM  ----------------------------------------------------------------------------------------------------------------------    Jhoana London,     Please return to ER or call 911 if you develop any significant signs or symptoms.     I may not have addressed all of your medical illnesses or the abnormal blood work or imaging therefore please ask your PCP None, None and other out patient specialists and providers  to obtain [facility] record entirely to follow up on all of the abnormal labs, imaging and findings that I have and have not addressed during your hospitalization.      Discharging you from the hospital does not mean that your medical care ends here and now. You may still need additional work up, investigation, monitoring, and treatment to be handled from this point on by outside providers including your PCP, None, None , Specialists and other healthcare providers.      Please review your list of discharge medications prior to resuming medications you might still have at home, as the medications you need to be taking, dosages or how often

## 2024-10-25 NOTE — PLAN OF CARE
Problem: Discharge Planning  Goal: Discharge to home or other facility with appropriate resources  10/25/2024 1410 by Carey Farmer RN  Outcome: Adequate for Discharge  10/25/2024 0638 by Rodrigo Villegas RN  Outcome: Progressing     Problem: Pain  Goal: Verbalizes/displays adequate comfort level or baseline comfort level  10/25/2024 1410 by Carey Farmer RN  Outcome: Adequate for Discharge  10/25/2024 0638 by Rodrigo Villegas RN  Outcome: Progressing     Problem: ABCDS Injury Assessment  Goal: Absence of physical injury  10/25/2024 1410 by Carey Farmer RN  Outcome: Adequate for Discharge  Flowsheets (Taken 10/25/2024 0937)  Absence of Physical Injury: Implement safety measures based on patient assessment  10/25/2024 0638 by Rodrigo Villegas RN  Outcome: Progressing     Problem: Safety - Adult  Goal: Free from fall injury  10/25/2024 1410 by Carey Farmer RN  Outcome: Adequate for Discharge  Flowsheets (Taken 10/25/2024 0937)  Free From Fall Injury: Instruct family/caregiver on patient safety  10/25/2024 0638 by Rodrigo Villegas RN  Outcome: Progressing     Problem: Nutrition Deficit:  Goal: Optimize nutritional status  10/25/2024 1410 by Carey Farmer RN  Outcome: Adequate for Discharge  10/25/2024 0638 by Rodrigo Villegas RN  Outcome: Progressing     Problem: Occupational Therapy - Adult  Goal: By Discharge: Performs self-care activities at highest level of function for planned discharge setting.  See evaluation for individualized goals.  10/25/2024 1254 by Karina Andrews, OTR/L  Outcome: Completed

## 2024-10-25 NOTE — PROGRESS NOTES
CLINICAL PHARMACY NOTE: MEDS TO BEDS    Total # of Prescriptions Filled: 2   The following medications were delivered to the patient:  Eliquis 5mg tab  Albuterol inhaler       Additional Documentation:

## 2024-10-25 NOTE — PROGRESS NOTES
PROGRESS NOTE    Patient Name: Jhoana London  Admit Date: 10/22/2024  7:27 PM  MR #: [id number]  : 1970    Attending Physician: Gabino Leach MD  Reason for consult: NSTEMI    History of Presenting Illness:      Jhoana London is a 54 y.o. female on hospital day 3 with a history of .   History Obtained From:  patient, electronic medical record    Pt has h/o PAD and Crohn's presents with CP radiating to neck and upper back. She had nausea and did vomit. H&P and ER notes suggest she has CAD but she does not.  She has PAD and on DAPT for this reason. No prior cardiac Stents.    ECG SR 86 no acute changes noted.  HS Troponin 335 > 287.   She received ASA and Lovenox.  BP is borderline and no BB given.  She is more comfortable now and no ongoing CP.    CTA Chest no dissection nor PE.     10/24/24 Tele SR 78 no cp no sob. Feels gaseous and can't eat.  Has not eaten since Saturday per pt.       10/25/24 DC held due to onset of new Rapid AF.  She was able to sense the Tachy. CCB iv bolus and gtt started. Never had this before. She converted to SR last PM.  She feels better now. No cp no sob  EKG:  Past Medical History:   Diagnosis Date    Smoker      History reviewed. No pertinent surgical history.    Family History  History reviewed. No pertinent family history.  [] Unable to obtain due to ventilated and/ or neurologic status    Social History     Socioeconomic History    Marital status:      Spouse name: Not on file    Number of children: Not on file    Years of education: Not on file    Highest education level: Not on file   Occupational History    Not on file   Tobacco Use    Smoking status: Not on file    Smokeless tobacco: Not on file   Substance and Sexual Activity    Alcohol use: Not on file    Drug use: Not on file    Sexual activity: Not on file   Other Topics Concern    Not on file   Social History Narrative    Not on file     Social Determinants of Health     Financial Resource Strain: Medium Risk

## 2024-10-25 NOTE — PROGRESS NOTES
1430- [employer] rep present at bedside for fitting and teaching. Iv and tele removed for discharge. Care plan completed for discharge. Appropriate education addressed in discharge instructions. Instructions completed and reviewed with patient. Verbalize understanding of instructions and follow up.     Electronically signed by Carey Farmer RN on 10/25/2024 at 2:30 PM

## 2024-10-26 LAB
EKG ATRIAL RATE: 153 BPM
EKG P AXIS: 25 DEGREES
EKG P-R INTERVAL: 156 MS
EKG Q-T INTERVAL: 336 MS
EKG QRS DURATION: 116 MS
EKG QTC CALCULATION (BAZETT): 514 MS
EKG R AXIS: 44 DEGREES
EKG T AXIS: 248 DEGREES
EKG VENTRICULAR RATE: 141 BPM

## 2024-10-26 PROCEDURE — 93010 ELECTROCARDIOGRAM REPORT: CPT | Performed by: INTERNAL MEDICINE

## 2024-10-27 LAB — ECHO BSA: 1.53 M2

## 2024-10-28 LAB — PTH RELATED PROT SERPL-SCNC: 2.3 PMOL/L (ref 0–3.4)

## 2024-10-29 ENCOUNTER — HOSPITAL ENCOUNTER (INPATIENT)
Age: 54
LOS: 2 days | Discharge: HOME | End: 2024-10-31
Attending: EMERGENCY MEDICINE | Admitting: INTERNAL MEDICINE
Payer: COMMERCIAL

## 2024-10-29 ENCOUNTER — APPOINTMENT (OUTPATIENT)
Dept: CT IMAGING | Age: 54
End: 2024-10-29
Payer: COMMERCIAL

## 2024-10-29 ENCOUNTER — APPOINTMENT (OUTPATIENT)
Dept: GENERAL RADIOLOGY | Age: 54
End: 2024-10-29
Payer: COMMERCIAL

## 2024-10-29 DIAGNOSIS — J18.9 POSTOBSTRUCTIVE PNEUMONIA: ICD-10-CM

## 2024-10-29 DIAGNOSIS — N20.1 URETERIC STONE: ICD-10-CM

## 2024-10-29 DIAGNOSIS — K92.2 GASTROINTESTINAL HEMORRHAGE, UNSPECIFIED GASTROINTESTINAL HEMORRHAGE TYPE: Primary | ICD-10-CM

## 2024-10-29 DIAGNOSIS — I95.9 HYPOTENSION, UNSPECIFIED HYPOTENSION TYPE: ICD-10-CM

## 2024-10-29 DIAGNOSIS — N83.209 CYST OF OVARY, UNSPECIFIED LATERALITY: ICD-10-CM

## 2024-10-29 LAB
ABO + RH BLD: NORMAL
ALBUMIN SERPL-MCNC: 3.2 G/DL (ref 3.5–4.6)
ALP SERPL-CCNC: 43 U/L (ref 40–130)
ALT SERPL-CCNC: 14 U/L (ref 0–33)
ANION GAP SERPL CALCULATED.3IONS-SCNC: 8 MEQ/L (ref 9–15)
APTT PPP: 21.7 SEC (ref 24.4–36.8)
AST SERPL-CCNC: 20 U/L (ref 0–35)
BACTERIA URNS QL MICRO: NEGATIVE /HPF
BASOPHILS # BLD: 0.1 K/UL (ref 0–0.2)
BASOPHILS NFR BLD: 0.4 %
BILIRUB SERPL-MCNC: 0.4 MG/DL (ref 0.2–0.7)
BILIRUB UR QL STRIP: NEGATIVE
BLD GP AB SCN SERPL QL: NORMAL
BNP BLD-MCNC: 2908 PG/ML
BUN SERPL-MCNC: 18 MG/DL (ref 6–20)
CALCIUM SERPL-MCNC: 8.7 MG/DL (ref 8.5–9.9)
CHLORIDE SERPL-SCNC: 97 MEQ/L (ref 95–107)
CHP ED QC CHECK: NORMAL
CLARITY UR: CLEAR
CO2 SERPL-SCNC: 26 MEQ/L (ref 20–31)
COLOR UR: YELLOW
CREAT SERPL-MCNC: 0.79 MG/DL (ref 0.5–0.9)
EOSINOPHIL # BLD: 0.2 K/UL (ref 0–0.7)
EOSINOPHIL NFR BLD: 1.4 %
EPI CELLS #/AREA URNS AUTO: ABNORMAL /HPF (ref 0–5)
ERYTHROCYTE [DISTWIDTH] IN BLOOD BY AUTOMATED COUNT: 13.3 % (ref 11.5–14.5)
GLOBULIN SER CALC-MCNC: 2.2 G/DL (ref 2.3–3.5)
GLUCOSE SERPL-MCNC: 87 MG/DL (ref 70–99)
GLUCOSE UR STRIP-MCNC: NEGATIVE MG/DL
HCT VFR BLD AUTO: 34.5 % (ref 37–47)
HCT VFR BLD AUTO: 41.2 % (ref 37–47)
HCT VFR BLD AUTO: 47.3 % (ref 37–47)
HEMOCCULT STL QL: POSITIVE
HGB BLD-MCNC: 11.9 G/DL (ref 12–16)
HGB BLD-MCNC: 14.7 G/DL (ref 12–16)
HGB BLD-MCNC: 16.2 G/DL (ref 12–16)
HGB UR QL STRIP: ABNORMAL
HYALINE CASTS #/AREA URNS AUTO: ABNORMAL /HPF (ref 0–5)
INR PPP: 1.1
KETONES UR STRIP-MCNC: NEGATIVE MG/DL
LACTATE BLDV-SCNC: 0.9 MMOL/L (ref 0.5–2.2)
LACTIC ACID, SEPSIS: 0.6 MMOL/L (ref 0.5–1.9)
LACTIC ACID, SEPSIS: 1.5 MMOL/L (ref 0.5–1.9)
LEUKOCYTE ESTERASE UR QL STRIP: NEGATIVE
LIPASE SERPL-CCNC: 17 U/L (ref 12–95)
LYMPHOCYTES # BLD: 3.2 K/UL (ref 1–4.8)
LYMPHOCYTES NFR BLD: 18.9 %
MAGNESIUM SERPL-MCNC: 1.5 MG/DL (ref 1.7–2.4)
MCH RBC QN AUTO: 32.3 PG (ref 27–31.3)
MCHC RBC AUTO-ENTMCNC: 34.2 % (ref 33–37)
MCV RBC AUTO: 94.4 FL (ref 79.4–94.8)
MONOCYTES # BLD: 1.4 K/UL (ref 0.2–0.8)
MONOCYTES NFR BLD: 8.4 %
NEUTROPHILS # BLD: 11.7 K/UL (ref 1.4–6.5)
NEUTS SEG NFR BLD: 70.3 %
NITRITE UR QL STRIP: NEGATIVE
PH UR STRIP: 7.5 [PH] (ref 5–9)
PLATELET # BLD AUTO: 410 K/UL (ref 130–400)
POTASSIUM SERPL-SCNC: 3.9 MEQ/L (ref 3.4–4.9)
PROCALCITONIN SERPL IA-MCNC: 0.05 NG/ML (ref 0–0.15)
PROT SERPL-MCNC: 5.4 G/DL (ref 6.3–8)
PROT UR STRIP-MCNC: NEGATIVE MG/DL
PROTHROMBIN TIME: 14.6 SEC (ref 12.3–14.9)
RBC # BLD AUTO: 5.01 M/UL (ref 4.2–5.4)
RBC #/AREA URNS AUTO: ABNORMAL /HPF (ref 0–5)
SODIUM SERPL-SCNC: 131 MEQ/L (ref 135–144)
SP GR UR STRIP: 1.03 (ref 1–1.03)
TROPONIN, HIGH SENSITIVITY: 40 NG/L (ref 0–19)
TROPONIN, HIGH SENSITIVITY: 42 NG/L (ref 0–19)
TROPONIN, HIGH SENSITIVITY: 44 NG/L (ref 0–19)
URINE REFLEX TO CULTURE: ABNORMAL
UROBILINOGEN UR STRIP-ACNC: 0.2 E.U./DL
WBC # BLD AUTO: 16.7 K/UL (ref 4.8–10.8)
WBC #/AREA URNS AUTO: ABNORMAL /HPF (ref 0–5)

## 2024-10-29 PROCEDURE — 85014 HEMATOCRIT: CPT

## 2024-10-29 PROCEDURE — 51701 INSERT BLADDER CATHETER: CPT

## 2024-10-29 PROCEDURE — 2580000003 HC RX 258: Performed by: EMERGENCY MEDICINE

## 2024-10-29 PROCEDURE — 2580000003 HC RX 258: Performed by: STUDENT IN AN ORGANIZED HEALTH CARE EDUCATION/TRAINING PROGRAM

## 2024-10-29 PROCEDURE — 96365 THER/PROPH/DIAG IV INF INIT: CPT

## 2024-10-29 PROCEDURE — 85730 THROMBOPLASTIN TIME PARTIAL: CPT

## 2024-10-29 PROCEDURE — 6360000004 HC RX CONTRAST MEDICATION: Performed by: EMERGENCY MEDICINE

## 2024-10-29 PROCEDURE — 96375 TX/PRO/DX INJ NEW DRUG ADDON: CPT

## 2024-10-29 PROCEDURE — 84484 ASSAY OF TROPONIN QUANT: CPT

## 2024-10-29 PROCEDURE — 6360000002 HC RX W HCPCS: Performed by: INTERNAL MEDICINE

## 2024-10-29 PROCEDURE — 86901 BLOOD TYPING SEROLOGIC RH(D): CPT

## 2024-10-29 PROCEDURE — 96367 TX/PROPH/DG ADDL SEQ IV INF: CPT

## 2024-10-29 PROCEDURE — 2500000003 HC RX 250 WO HCPCS: Performed by: INTERNAL MEDICINE

## 2024-10-29 PROCEDURE — 83735 ASSAY OF MAGNESIUM: CPT

## 2024-10-29 PROCEDURE — 99253 IP/OBS CNSLTJ NEW/EST LOW 45: CPT | Performed by: SPECIALIST

## 2024-10-29 PROCEDURE — 74174 CTA ABD&PLVS W/CONTRAST: CPT

## 2024-10-29 PROCEDURE — 2000000000 HC ICU R&B

## 2024-10-29 PROCEDURE — 6370000000 HC RX 637 (ALT 250 FOR IP): Performed by: INTERNAL MEDICINE

## 2024-10-29 PROCEDURE — 81001 URINALYSIS AUTO W/SCOPE: CPT

## 2024-10-29 PROCEDURE — 86900 BLOOD TYPING SEROLOGIC ABO: CPT

## 2024-10-29 PROCEDURE — 85025 COMPLETE CBC W/AUTO DIFF WBC: CPT

## 2024-10-29 PROCEDURE — 93005 ELECTROCARDIOGRAM TRACING: CPT | Performed by: EMERGENCY MEDICINE

## 2024-10-29 PROCEDURE — 71250 CT THORAX DX C-: CPT

## 2024-10-29 PROCEDURE — 6370000000 HC RX 637 (ALT 250 FOR IP): Performed by: STUDENT IN AN ORGANIZED HEALTH CARE EDUCATION/TRAINING PROGRAM

## 2024-10-29 PROCEDURE — 86850 RBC ANTIBODY SCREEN: CPT

## 2024-10-29 PROCEDURE — 2580000003 HC RX 258: Performed by: INTERNAL MEDICINE

## 2024-10-29 PROCEDURE — 84145 PROCALCITONIN (PCT): CPT

## 2024-10-29 PROCEDURE — 71045 X-RAY EXAM CHEST 1 VIEW: CPT

## 2024-10-29 PROCEDURE — 85018 HEMOGLOBIN: CPT

## 2024-10-29 PROCEDURE — 3E033XZ INTRODUCTION OF VASOPRESSOR INTO PERIPHERAL VEIN, PERCUTANEOUS APPROACH: ICD-10-PCS | Performed by: INTERNAL MEDICINE

## 2024-10-29 PROCEDURE — 36415 COLL VENOUS BLD VENIPUNCTURE: CPT

## 2024-10-29 PROCEDURE — 80053 COMPREHEN METABOLIC PANEL: CPT

## 2024-10-29 PROCEDURE — 99285 EMERGENCY DEPT VISIT HI MDM: CPT

## 2024-10-29 PROCEDURE — 83690 ASSAY OF LIPASE: CPT

## 2024-10-29 PROCEDURE — 99253 IP/OBS CNSLTJ NEW/EST LOW 45: CPT | Performed by: PHYSICIAN ASSISTANT

## 2024-10-29 PROCEDURE — 83605 ASSAY OF LACTIC ACID: CPT

## 2024-10-29 PROCEDURE — 6360000002 HC RX W HCPCS: Performed by: EMERGENCY MEDICINE

## 2024-10-29 PROCEDURE — 83880 ASSAY OF NATRIURETIC PEPTIDE: CPT

## 2024-10-29 PROCEDURE — 85610 PROTHROMBIN TIME: CPT

## 2024-10-29 PROCEDURE — 87040 BLOOD CULTURE FOR BACTERIA: CPT

## 2024-10-29 PROCEDURE — P9047 ALBUMIN (HUMAN), 25%, 50ML: HCPCS | Performed by: INTERNAL MEDICINE

## 2024-10-29 RX ORDER — ACETAMINOPHEN 325 MG/1
650 TABLET ORAL EVERY 6 HOURS PRN
Status: DISCONTINUED | OUTPATIENT
Start: 2024-10-29 | End: 2024-10-31 | Stop reason: HOSPADM

## 2024-10-29 RX ORDER — ONDANSETRON 4 MG/1
4 TABLET, ORALLY DISINTEGRATING ORAL EVERY 8 HOURS PRN
Status: DISCONTINUED | OUTPATIENT
Start: 2024-10-29 | End: 2024-10-31 | Stop reason: HOSPADM

## 2024-10-29 RX ORDER — ONDANSETRON 2 MG/ML
4 INJECTION INTRAMUSCULAR; INTRAVENOUS EVERY 6 HOURS PRN
Status: DISCONTINUED | OUTPATIENT
Start: 2024-10-29 | End: 2024-10-31 | Stop reason: HOSPADM

## 2024-10-29 RX ORDER — DIPHENHYDRAMINE HCL 25 MG
25 CAPSULE ORAL NIGHTLY PRN
COMMUNITY

## 2024-10-29 RX ORDER — IOPAMIDOL 755 MG/ML
100 INJECTION, SOLUTION INTRAVASCULAR
Status: COMPLETED | OUTPATIENT
Start: 2024-10-29 | End: 2024-10-29

## 2024-10-29 RX ORDER — 0.9 % SODIUM CHLORIDE 0.9 %
INTRAVENOUS SOLUTION INTRAVENOUS CONTINUOUS PRN
Status: COMPLETED | OUTPATIENT
Start: 2024-10-29 | End: 2024-10-29

## 2024-10-29 RX ORDER — ARIPIPRAZOLE 2 MG/1
2 TABLET ORAL DAILY
Status: DISCONTINUED | OUTPATIENT
Start: 2024-10-29 | End: 2024-10-31 | Stop reason: HOSPADM

## 2024-10-29 RX ORDER — 0.9 % SODIUM CHLORIDE 0.9 %
500 INTRAVENOUS SOLUTION INTRAVENOUS ONCE
Status: COMPLETED | OUTPATIENT
Start: 2024-10-29 | End: 2024-10-29

## 2024-10-29 RX ORDER — ATORVASTATIN CALCIUM 20 MG/1
20 TABLET, FILM COATED ORAL NIGHTLY
Status: DISCONTINUED | OUTPATIENT
Start: 2024-10-29 | End: 2024-10-31 | Stop reason: HOSPADM

## 2024-10-29 RX ORDER — GUAIFENESIN 600 MG/1
600 TABLET, EXTENDED RELEASE ORAL 2 TIMES DAILY
Status: DISCONTINUED | OUTPATIENT
Start: 2024-10-29 | End: 2024-10-31 | Stop reason: HOSPADM

## 2024-10-29 RX ORDER — ASPIRIN 325 MG
325 TABLET ORAL DAILY
Status: ON HOLD | COMMUNITY
End: 2024-10-31 | Stop reason: HOSPADM

## 2024-10-29 RX ORDER — ERGOCALCIFEROL 1.25 MG/1
50000 CAPSULE ORAL WEEKLY
COMMUNITY

## 2024-10-29 RX ORDER — IBUPROFEN 600 MG/1
600 TABLET, FILM COATED ORAL EVERY 6 HOURS PRN
Status: ON HOLD | COMMUNITY
Start: 2024-09-26 | End: 2024-10-31 | Stop reason: HOSPADM

## 2024-10-29 RX ORDER — SODIUM CHLORIDE 0.9 % (FLUSH) 0.9 %
5-40 SYRINGE (ML) INJECTION PRN
Status: DISCONTINUED | OUTPATIENT
Start: 2024-10-29 | End: 2024-10-31 | Stop reason: HOSPADM

## 2024-10-29 RX ORDER — SODIUM CHLORIDE 0.9 % (FLUSH) 0.9 %
5-40 SYRINGE (ML) INJECTION EVERY 12 HOURS SCHEDULED
Status: DISCONTINUED | OUTPATIENT
Start: 2024-10-29 | End: 2024-10-31 | Stop reason: HOSPADM

## 2024-10-29 RX ORDER — MIDODRINE HYDROCHLORIDE 10 MG/1
10 TABLET ORAL
Status: DISCONTINUED | OUTPATIENT
Start: 2024-10-30 | End: 2024-10-31

## 2024-10-29 RX ORDER — METOPROLOL SUCCINATE 25 MG/1
25 TABLET, EXTENDED RELEASE ORAL DAILY
Status: DISCONTINUED | OUTPATIENT
Start: 2024-10-29 | End: 2024-10-31 | Stop reason: HOSPADM

## 2024-10-29 RX ORDER — DULOXETIN HYDROCHLORIDE 20 MG/1
20 CAPSULE, DELAYED RELEASE ORAL DAILY
COMMUNITY

## 2024-10-29 RX ORDER — SODIUM CHLORIDE 9 MG/ML
INJECTION, SOLUTION INTRAVENOUS PRN
Status: DISCONTINUED | OUTPATIENT
Start: 2024-10-29 | End: 2024-10-31 | Stop reason: HOSPADM

## 2024-10-29 RX ORDER — METHYLPREDNISOLONE SODIUM SUCCINATE 40 MG/ML
20 INJECTION, POWDER, LYOPHILIZED, FOR SOLUTION INTRAMUSCULAR; INTRAVENOUS DAILY
Status: DISCONTINUED | OUTPATIENT
Start: 2024-10-29 | End: 2024-10-31

## 2024-10-29 RX ORDER — NOREPINEPHRINE BITARTRATE 0.06 MG/ML
1-100 INJECTION, SOLUTION INTRAVENOUS CONTINUOUS
Status: DISCONTINUED | OUTPATIENT
Start: 2024-10-29 | End: 2024-10-30

## 2024-10-29 RX ORDER — ALBUMIN (HUMAN) 12.5 G/50ML
25 SOLUTION INTRAVENOUS ONCE
Status: COMPLETED | OUTPATIENT
Start: 2024-10-29 | End: 2024-10-29

## 2024-10-29 RX ORDER — GUAIFENESIN 600 MG/1
1200 TABLET, EXTENDED RELEASE ORAL 2 TIMES DAILY PRN
COMMUNITY

## 2024-10-29 RX ORDER — TIOTROPIUM BROMIDE 18 UG/1
18 CAPSULE ORAL; RESPIRATORY (INHALATION) DAILY
COMMUNITY

## 2024-10-29 RX ORDER — MAGNESIUM SULFATE 1 G/100ML
1000 INJECTION INTRAVENOUS ONCE
Status: COMPLETED | OUTPATIENT
Start: 2024-10-29 | End: 2024-10-29

## 2024-10-29 RX ORDER — MIDODRINE HYDROCHLORIDE 2.5 MG/1
TABLET ORAL DAILY PRN
Status: COMPLETED | OUTPATIENT
Start: 2024-10-29 | End: 2024-10-29

## 2024-10-29 RX ORDER — ACETAMINOPHEN 650 MG/1
650 SUPPOSITORY RECTAL EVERY 6 HOURS PRN
Status: DISCONTINUED | OUTPATIENT
Start: 2024-10-29 | End: 2024-10-31 | Stop reason: HOSPADM

## 2024-10-29 RX ADMIN — MAGNESIUM SULFATE HEPTAHYDRATE 1000 MG: 1 INJECTION, SOLUTION INTRAVENOUS at 12:49

## 2024-10-29 RX ADMIN — SODIUM CHLORIDE 1000 ML: 9 INJECTION, SOLUTION INTRAVENOUS at 18:38

## 2024-10-29 RX ADMIN — SODIUM CHLORIDE, PRESERVATIVE FREE 40 MG: 5 INJECTION INTRAVENOUS at 22:31

## 2024-10-29 RX ADMIN — CEFEPIME 2000 MG: 2 INJECTION, POWDER, FOR SOLUTION INTRAVENOUS at 15:04

## 2024-10-29 RX ADMIN — SODIUM CHLORIDE, PRESERVATIVE FREE 10 ML: 5 INJECTION INTRAVENOUS at 22:37

## 2024-10-29 RX ADMIN — VANCOMYCIN HYDROCHLORIDE 1000 MG: 1 INJECTION, POWDER, LYOPHILIZED, FOR SOLUTION INTRAVENOUS at 13:58

## 2024-10-29 RX ADMIN — ALBUMIN (HUMAN) 25 G: 0.25 INJECTION, SOLUTION INTRAVENOUS at 20:06

## 2024-10-29 RX ADMIN — NOREPINEPHRINE BITARTRATE 5 MCG/MIN: 64 SOLUTION INTRAVENOUS at 22:19

## 2024-10-29 RX ADMIN — ARIPIPRAZOLE 2 MG: 2 TABLET ORAL at 22:31

## 2024-10-29 RX ADMIN — GUAIFENESIN 600 MG: 600 TABLET ORAL at 22:31

## 2024-10-29 RX ADMIN — IOPAMIDOL 100 ML: 755 INJECTION, SOLUTION INTRAVENOUS at 12:30

## 2024-10-29 RX ADMIN — METHYLPREDNISOLONE SODIUM SUCCINATE 20 MG: 40 INJECTION, POWDER, LYOPHILIZED, FOR SOLUTION INTRAMUSCULAR; INTRAVENOUS at 22:59

## 2024-10-29 RX ADMIN — ATORVASTATIN CALCIUM 20 MG: 20 TABLET, FILM COATED ORAL at 22:31

## 2024-10-29 RX ADMIN — SODIUM CHLORIDE 500 ML: 9 INJECTION, SOLUTION INTRAVENOUS at 13:55

## 2024-10-29 RX ADMIN — PANTOPRAZOLE SODIUM 80 MG: 40 INJECTION, POWDER, FOR SOLUTION INTRAVENOUS at 11:04

## 2024-10-29 RX ADMIN — MIDODRINE HYDROCHLORIDE 10 MG: 2.5 TABLET ORAL at 18:43

## 2024-10-29 ASSESSMENT — ENCOUNTER SYMPTOMS
VOMITING: 1
COUGH: 0
APNEA: 0

## 2024-10-29 ASSESSMENT — PAIN - FUNCTIONAL ASSESSMENT: PAIN_FUNCTIONAL_ASSESSMENT: NONE - DENIES PAIN

## 2024-10-29 NOTE — ED PROVIDER NOTES
Neurological:  Negative for syncope.   All other systems reviewed and are negative.      Except as noted above the remainder of the review of systems was reviewed and negative.       PAST MEDICAL HISTORY     Past Medical History:   Diagnosis Date    Acid reflux     Anxiety     Asthma     COPD (chronic obstructive pulmonary disease) (HCC)     Crohn's colitis (HCC)     Ejection fraction < 50%     Heart failure (HCC)     PTSD (post-traumatic stress disorder)     Smoker          SURGICAL HISTORY       Past Surgical History:   Procedure Laterality Date    CARDIAC PROCEDURE N/A 10/23/2024    Left heart cath / coronary angiography performed by Fred Avila DO at [facility] [place] CARDIAC CATH LAB    CHOLECYSTECTOMY      HIATAL HERNIA REPAIR      TONSILLECTOMY           CURRENT MEDICATIONS       Previous Medications    ALBUTEROL SULFATE HFA (VENTOLIN HFA) 108 (90 BASE) MCG/ACT INHALER    Inhale 2 puffs into the lungs 4 times daily as needed for Wheezing    ALUMINUM & MAGNESIUM HYDROXIDE-SIMETHICONE (MAALOX) 200-200-20 MG/5ML SUSP SUSPENSION    Take 30 mLs by mouth every 6 hours as needed for Indigestion    APIXABAN (ELIQUIS) 5 MG TABS TABLET    Take 1 tablet by mouth 2 times daily    ARIPIPRAZOLE (ABILIFY) 2 MG TABLET    Take 1 tablet by mouth daily    ASPIRIN 325 MG TABLET    Take 1 tablet by mouth daily    ATORVASTATIN (LIPITOR) 20 MG TABLET    Take 1 tablet by mouth nightly    CLOPIDOGREL (PLAVIX) 75 MG TABLET    Take 1 tablet by mouth daily    DULOXETINE (CYMBALTA) 20 MG EXTENDED RELEASE CAPSULE    Take 1 capsule by mouth daily    FAMOTIDINE (PEPCID) 40 MG TABLET    Take 1 tablet by mouth 2 times daily    LORAZEPAM (ATIVAN) 1 MG TABLET    Take 1 tablet by mouth daily as needed.    METOPROLOL SUCCINATE (TOPROL XL) 25 MG EXTENDED RELEASE TABLET    Take 1 tablet by mouth daily    NITROGLYCERIN (NITROSTAT) 0.4 MG SL TABLET    Place 1 tablet under the tongue every 5 minutes as needed for Chest pain up to max of 3 total doses. If no  below, none     Procedures        FINAL IMPRESSION      1. Gastrointestinal hemorrhage, unspecified gastrointestinal hemorrhage type    2. Postobstructive pneumonia    3. Ureteric stone    4. Cyst of ovary, unspecified laterality          DISPOSITION/PLAN   DISPOSITION Decision To Admit 10/29/2024 01:39:12 PM           PATIENT REFERRED TO:  No follow-up provider specified.    DISCHARGE MEDICATIONS:  New Prescriptions    No medications on file     Controlled Substances Monitoring:          No data to display                (Please note that portions of this note were completed with a voice recognition program.  Efforts were made to edit the dictations but occasionally words are mis-transcribed.)    Jonathan Peters MD (electronically signed)  Attending Emergency Physician            Jonathan Peters MD  10/29/24 0702

## 2024-10-29 NOTE — ED NOTES
Dr. Peters aware of Penicillin allergies and Maxipime administration, pt resting in bed, 0 c/o, 0 distress.

## 2024-10-29 NOTE — ED NOTES
Got report from Melia.    Here for blood in emesis x1 this morning.  Being admitted for kidney stone and pneumonia.  Dx w/ chf last week, ejection fraction 20%, currently has life vest on and have to told to keep it on.  Currently getting antibiotics.

## 2024-10-29 NOTE — ED NOTES
DR. HOLT AWARE OF PT'S BP, 0 NEW ORDERS AT THIS TIME.  PT A&OX4 ,SKIN W/D/PINK, 0 C/O, 0 DISTRESS, 0 PAIN.

## 2024-10-29 NOTE — CONSULTS
Urology Consult      Jhoana London  1970  [id number]    Date of Admission:  10/29/2024 10:32 AM  Date of Consultation:  10/29/2024    Consultant: Keshawn Alvarado PA-C  SupervisingPhysician: Dr. Borden  PCP:  Elizabeth Crain MD       Reason for Consultation: ureteral stone      C/C:   Chief Complaint   Patient presents with    Hematemesis     Pt c/o vomiting blood x 1 this am       History of Present Illness: Urolithiasis  Patient complains of right flank pain without radiation to the abdomen. Onset of symptoms was    ago with stable course since that time. Patient describes the pain as colicky, continuous and rated as mild. The patient has had no nausea and no diaphoresis. There has been no fever or chills. The patient is not complaining of dysuria or frequency. Risk factors for urolithiasis: none.    Allergies:  Allergies   Allergen Reactions    Seasonal        PMH: Patient has a past medical history of Acid reflux, Anxiety, Asthma, COPD (chronic obstructive pulmonary disease) ([facility]), Crohn's colitis (HCC), Ejection fraction < 50%, Heart failure (HCC), PTSD (post-traumatic stress disorder), and Smoker.    PSH: Patient has a past surgical history that includes Cardiac procedure (N/A, 10/23/2024); hiatal hernia repair; Cholecystectomy; and Tonsillectomy.    Social History: Patient reports that she has been smoking cigarettes. She has been exposed to tobacco smoke. She does not have any smokeless tobacco history on file. She reports that she does not currently use alcohol. She reports current drug use. Drug: Marijuana (Weed).    Family History: Patientsfamily history is not on file.    ROS:  Review of Systems   Constitutional:  Negative for fever.   HENT:  Negative for congestion.    Respiratory:  Negative for apnea.    Genitourinary:  Negative for hematuria.   Neurological:  Negative for speech difficulty.       Current Meds: vancomycin 1000 mg IVPB in 250 mL NS addavial, Once  ceFEPIme (MAXIPIME) 2,000 mg in

## 2024-10-29 NOTE — CONSULTS
2019 [facility] Clinical Data Registry (for Quality Improvement)     Postoperative nausea/vomiting risk protocol (Adult = 18 yrs and Pediatric 3-17 yrs)- (430 and 463)  General inhalation anesthetic (NOT TIVA) with PONV risk factors: Yes  Provision of anti-emetic therapy with at least 2 different classes of agents: Yes   Patient DID NOT receive anti-emetic therapy and reason is documented in Medical Record:  N/A    Multimodal Pain Management- (AQI59)  Patient undergoing Elective Surgery (i.e. Outpatient, or ASC, or Prescheduled Surgery prior to Hospital Admission): Yes  Use of Multimodal Pain Management, two or more drugs and/or interventions, NOT including systemic opioids: Yes   Exception: Documented allergy to multiple classes of analgesics:  N/A    PACU assessment of acute postoperative pain prior to Anesthesia Care End- Applies to Patients Age = 18- (ABG7)  Initial PACU pain score is which of the following: < 7/10  Patient unable to report pain score: N/A    Post-anesthetic transfer of care checklist/protocol to PACU/ICU- (426 and 427)  Upon conclusion of case, patient transferred to which of the following locations: PACU/Non-ICU  Use of transfer checklist/protocol: Yes  Exclusion: Service Performed in Patient Hospital Room (and thus did not require transfer): N/A    PACU Reintubation- (AQI31)  General anesthesia requiring endotracheal intubation (ETT) along with subsequent extubation in OR or PACU: No  Required reintubation in the PACU: N/A  Extubation was a planned trial documented in the medical record prior to removal of the original airway device: N/A    Unplanned admission to ICU related to anesthesia service up through end of PACU care- (MD51)  Unplanned admission to ICU (not initially anticipated at anesthesia start time): No          Consult Note            Date:10/29/2024        Patient Name:Jhoana London     YOB: 1970     Age:54 y.o.    Consults GI bleeding    Chief Complaint     Chief Complaint   Patient presents with    Hematemesis     Pt c/o vomiting blood x 1 this am          History Obtained From   patient    History of Present Illness   53-year-old female admitted with 1 episode of hematemesis.  Reports no abdominal pain no melena , no prior history of ulcer disease.  Patient is in the ER waiting for bed.  Has not had any further emesis since coming to the hospital, patient has been on Eliquis aspirin and Plavix.  Patient has a history of Crohn's disease    Past Medical History     Past Medical History:   Diagnosis Date    Acid reflux     Anxiety     Asthma     COPD (chronic obstructive pulmonary disease) (HCC)     Crohn's colitis (HCC)     Ejection fraction < 50%     Heart failure (HCC)     PTSD (post-traumatic stress disorder)     Smoker         Past Surgical History     Past Surgical History:   Procedure Laterality Date    CARDIAC PROCEDURE N/A 10/23/2024    Left heart cath / coronary angiography performed by Fred Avila DO at [facility] CARDIAC CATH LAB    CHOLECYSTECTOMY      HIATAL HERNIA REPAIR      TONSILLECTOMY          Medications     Prior to Admission medications    Medication Sig Start Date End Date Taking? Authorizing Provider   aspirin 325 MG tablet Take 1 tablet by mouth daily   Yes Provider, MD Kash   tiotropium (SPIRIVA) 18 MCG inhalation capsule Inhale 1 capsule into the lungs daily   Yes Provider, MD Kash   DULoxetine (CYMBALTA) 20 MG extended release capsule Take 1 capsule by mouth daily   Yes Provider, MD Kash   apixaban (ELIQUIS) 5 MG TABS tablet Take 1 tablet by mouth 2 times daily 10/25/24   Zion Jeffries MD   albuterol sulfate HFA (VENTOLIN HFA) 108 (90 Base) MCG/ACT inhaler Inhale 2 puffs into the lungs 4 times daily as needed for Wheezing 10/25/24   Gabino Leach MD  lobe, with associated collapse. The bronchus leading to the superior segment of the left lower lobe abruptly ends just before the consolidation, indicating obstruction. This is presumably secondary to mucous impaction, since no similar findings were noted on the prior CT study 7 days ago. 2. Previously noted mucous containing right lower lobe bronchi are no longer seen. 3. Chronic interstitial lung changes are again noted in the anterior upper lobes. 4. Pulmonary parenchymal scar formation is again seen in the right upper lobe and right lower lobe. 5. Mild peribronchial cuffing/thickening is again seen bilaterally, indicating small airways disease.     XR CHEST PORTABLE    Result Date: 10/29/2024  Haziness in the left lower lobe may relate to soft tissues.  Consider follow-up PA and lateral chest films     CTA CHEST W WO CONTRAST    Result Date: 10/23/2024  1. No acute pulmonary embolus identified. 2. Dilated left ventricle, to a lesser degree atrium and pulmonary veins that may account for bronchial wall thickening versus chronic bronchitis. 3. Additional mild chronic lung disease, bronchiectasis, and partial airway filling mostly right lower lobe suggesting mucous. 4. A lesion in the right lower lobe and in the right upper lobe that are suggestive of areas of scarring, but each having a nonspecific nodular area, up to 1 cm size such that neoplasm is possible.  Per Fleischner society guidelines, consider a noncontrast chest CT at 3 months, a PET-CT, or tissue sampling.     XR CHEST (2 VW)    Result Date: 10/22/2024  No acute process. COPD.       Assessment      Hospital Problems             Last Modified POA    * (Principal) Gastrointestinal hemorrhage 10/29/2024 Yes    Ureteric stone 10/29/2024 Yes   53-year-old female admitted with hematemesis, had 1 episode of hematemesis and has not had any further emesis.  No melena.  She has been on aspirin ,Plavix and Eliquis.  Also has a history of chron's disease and not

## 2024-10-29 NOTE — CONSULTS
Pulmonary Medicine  Consult Note      Reason for consultation: Possible pneumonia    Consulting physician: Dr. Rausch    HISTORY OF PRESENT ILLNESS:      This is a 54-year-old female who presents for complaints of emesis x 1 with blood today and intermittent heartburn.  She describes a burning sensation in the center of her chest.  Denies c/p, sob, headache, dizziness, jaw pain radiation, numbness, or, tingling.  She denies dark tarry stools.  Patient just recently diagnosed with CHF and NSTEMI; status post cardiac cath with with an EF of 20-25% and is currently on Plavix, Entresto, Lipitor and metoprolol.  Patient also with new onset PAF and her ASA was stopped and she was started on Apixaban.     Last time discharged home with LifeVest.  All blood thinners were held at this time.  CT of the chest reveals left lower lobe atelectasis/consolidation and mucous plugging in the right lower lobe bronchi.    Patient had DrChris Day due to hypotension.  She may be going to ICU       Past Medical History:        Diagnosis Date    Acid reflux     Anxiety     Asthma     COPD (chronic obstructive pulmonary disease) (HCC)     Crohn's colitis (HCC)     Ejection fraction < 50%     Heart failure (HCC)     PTSD (post-traumatic stress disorder)     Smoker        Past Surgical History:        Procedure Laterality Date    CARDIAC PROCEDURE N/A 10/23/2024    Left heart cath / coronary angiography performed by Fred Avila DO at [facility] [place] CARDIAC CATH LAB    CHOLECYSTECTOMY      HIATAL HERNIA REPAIR      TONSILLECTOMY         Social History:     reports that she has been smoking cigarettes. She has been exposed to tobacco smoke. She does not have any smokeless tobacco history on file. She reports that she does not currently use alcohol. She reports current drug use. Drug: Marijuana (Weed).    Family History:   History reviewed. No pertinent family history.    Allergies:  Penicillins and Seasonal      MEDICATIONS during current

## 2024-10-29 NOTE — ED TRIAGE NOTES
Pt c/o blood in emesis this am x 1, pt reports hr. Of crohn's, acid reflux and CHF. Pt a&ox4 on arrival, skin w/d/pink, 0 chest pain, 0 sob noted or reported, bp low, per pt this is he normal d/t low EF 20% that she was dx. With week ago while admitted to this hospital.

## 2024-10-29 NOTE — H&P
HISTORY AND PHYSICAL             Date: 10/29/2024        Patient Name: Jhoana London     YOB: 1970      Age:  54 y.o.    Chief Complaint     Chief Complaint   Patient presents with    Hematemesis     Pt c/o vomiting blood x 1 this am          History Obtained From   patient, electronic medical record    History of Present Illness   Patient is a 54-year-old female who presents for complaints of vomiting blood and heartburn.  Patient reports emesis x 1 with blood today and intermittent heartburn.  She describes a burning sensation in the center of her chest.  Denies c/p, sob, headache, dizziness, jaw pain radiation, numbness, or, tingling.  She denies dark tarry stools.  Patient just recently diagnosed with CHF and NSTEMI; status post cardiac cath with with an EF of 20-25% and is currently on Plavix, Entresto, Lipitor and metoprolol.  Patient also with new onset PAF and her ASA was stopped and she was started on Apixaban.  Patient discharged home with LifeVest.  All blood thinners were held at this time.  CT of the chest reveals left lower lobe atelectasis/consolidation and mucous plugging in the right lower lobe bronchi.CTA of the abdomen pelvis reveals 0.7 cm calculus in the distal right ureter adjacent to the UVJ a partially obstructing urethral calculi . WBC 16.7 Hg 16.2, sodium 131, magnesium 1.5.  Patient given magnesium at 1000 mg, pantoprazole 80 mg vancomycin and a bolus of IV fluids in ED.  Past Medical History     Past Medical History:   Diagnosis Date    Acid reflux     Anxiety     Asthma     COPD (chronic obstructive pulmonary disease) (HCC)     Crohn's colitis (HCC)     Ejection fraction < 50%     Heart failure (HCC)     PTSD (post-traumatic stress disorder)     Smoker         Past Surgical History     Past Surgical History:   Procedure Laterality Date    CARDIAC PROCEDURE N/A 10/23/2024    Left heart cath / coronary angiography performed by Fred Avila DO at [facility] CARDIAC CATH LAB     10:41 am COMPARISON: October 22nd HISTORY: ORDERING SYSTEM PROVIDED HISTORY: hematemsis, epigastric pain TECHNOLOGIST PROVIDED HISTORY: Reason for exam:->hematemsis, epigastric pain What reading provider will be dictating this exam?->CRC FINDINGS: Overlying hardware is seen.  The heart size is normal.  Some haziness overlies the left lower lobe, which may relate to soft tissues     Haziness in the left lower lobe may relate to soft tissues.  Consider follow-up PA and lateral chest films       Assessment      Hospital Problems             Last Modified POA    * (Principal) Gastrointestinal hemorrhage 10/29/2024 Yes    Ureteric stone 10/29/2024 Yes       Plan   Gastrointestinal hemorrhage; Ureteric  stone  -Lactate every 2 hours x 2 occurrence  -Consult GI; consult pulmonology; consult cardiology  -Consult urology  -Telemetry  -Clear liquid diet  -Oxygen protocol  -DuoNeb treatments, Acapella, Solu-Medrol 20 mg daily  -Spiriva 2 puffs daily  -APTT tomorrow; BMP CBC daily hemoglobin hematocrit every 8 hours, magnesium daily, PT/INR in a.m., lactate every 2 hours    CHF, A-fib NSTEMI  -Metoprolol 25 mg daily, atorvastatin 20 mg daily  Pneumatic compression device    Additional work up or/and treatment plan may be added today or then after based on clinical progression by other providers or specialists.  Patient will need to follow-up with PCP for chronic disease management.     I have spent greater than 70% of time  spent focused exclusively on this patient ,reviewing  chart, labs/diagnostics, reconciling medications, &  answering questions with patient and discussing plan.    Consultations Ordered:  IP CONSULT TO GI  IP CONSULT TO GI  IP CONSULT TO CARDIOLOGY  IP CONSULT TO PULMONOLOGY  IP CONSULT TO UROLOGY    Electronically signed by MALISSA Camacho CNP on 10/29/24 at 3:06 PM EDT

## 2024-10-29 NOTE — SIGNIFICANT EVENT
Rapid response called out of concern for hypotension. SBP in 70s and MAPs in high 40s on my arrival, HR in NSR. Patient admitted currently out of concern for UGI bleed and has known history of HFrEF. She was completely asymptomatic. She says her BP tends to run low and has taken midodrine in past, but is not currently taking it. She says her SBP typically runs in 90s. She also notes she has not eaten/drank much in previous 2 days.     Plan:     -1 L NS bolus ordered. Monitor for respiratory symptoms given her HFrEF  -H/H, lactate pending  -Starting midodrine 10 mg TID

## 2024-10-30 ENCOUNTER — ANESTHESIA (OUTPATIENT)
Dept: ENDOSCOPY | Age: 54
End: 2024-10-30
Payer: COMMERCIAL

## 2024-10-30 ENCOUNTER — APPOINTMENT (OUTPATIENT)
Age: 54
End: 2024-10-30
Attending: INTERNAL MEDICINE
Payer: COMMERCIAL

## 2024-10-30 ENCOUNTER — APPOINTMENT (OUTPATIENT)
Dept: GENERAL RADIOLOGY | Age: 54
End: 2024-10-30
Payer: COMMERCIAL

## 2024-10-30 ENCOUNTER — ANESTHESIA EVENT (OUTPATIENT)
Dept: ENDOSCOPY | Age: 54
End: 2024-10-30
Payer: COMMERCIAL

## 2024-10-30 PROBLEM — K92.2 GI BLEEDING: Status: ACTIVE | Noted: 2024-10-29

## 2024-10-30 PROBLEM — J18.9 POSTOBSTRUCTIVE PNEUMONIA: Status: ACTIVE | Noted: 2024-10-30

## 2024-10-30 PROBLEM — I95.9 HYPOTENSION: Status: ACTIVE | Noted: 2024-10-30

## 2024-10-30 LAB
ANION GAP SERPL CALCULATED.3IONS-SCNC: 11 MEQ/L (ref 9–15)
APTT PPP: 26.6 SEC (ref 24.4–36.8)
BASOPHILS # BLD: 0 K/UL (ref 0–0.2)
BASOPHILS NFR BLD: 0.2 %
BUN SERPL-MCNC: 15 MG/DL (ref 6–20)
CALCIUM SERPL-MCNC: 7.7 MG/DL (ref 8.5–9.9)
CHLORIDE SERPL-SCNC: 103 MEQ/L (ref 95–107)
CO2 SERPL-SCNC: 18 MEQ/L (ref 20–31)
CREAT SERPL-MCNC: 0.76 MG/DL (ref 0.5–0.9)
ECHO BSA: 1.54 M2
ECHO LA VOL A-L A2C: 39 ML (ref 22–52)
ECHO LA VOL A-L A4C: 48 ML (ref 22–52)
ECHO LA VOL MOD A2C: 38 ML (ref 22–52)
ECHO LA VOL MOD A4C: 44 ML (ref 22–52)
ECHO LA VOLUME AREA LENGTH: 46 ML
ECHO LA VOLUME INDEX A-L A2C: 25 ML/M2 (ref 16–34)
ECHO LA VOLUME INDEX A-L A4C: 31 ML/M2 (ref 16–34)
ECHO LA VOLUME INDEX AREA LENGTH: 29 ML/M2 (ref 16–34)
ECHO LA VOLUME INDEX MOD A2C: 24 ML/M2 (ref 16–34)
ECHO LA VOLUME INDEX MOD A4C: 28 ML/M2 (ref 16–34)
ECHO LV E' LATERAL VELOCITY: 11.6 CM/S
ECHO LV E' SEPTAL VELOCITY: 9.9 CM/S
ECHO LV EDV A2C: 58 ML
ECHO LV EDV A4C: 72 ML
ECHO LV EDV BP: 63 ML (ref 56–104)
ECHO LV EDV INDEX A4C: 46 ML/M2
ECHO LV EDV INDEX BP: 40 ML/M2
ECHO LV EDV NDEX A2C: 37 ML/M2
ECHO LV EJECTION FRACTION A2C: 61 %
ECHO LV EJECTION FRACTION A4C: 56 %
ECHO LV EJECTION FRACTION BIPLANE: 53 % (ref 55–100)
ECHO LV ESV A2C: 23 ML
ECHO LV ESV A4C: 32 ML
ECHO LV ESV BP: 29 ML (ref 19–49)
ECHO LV ESV INDEX A2C: 15 ML/M2
ECHO LV ESV INDEX A4C: 21 ML/M2
ECHO LV ESV INDEX BP: 19 ML/M2
ECHO MV A VELOCITY: 0.56 M/S
ECHO MV E DECELERATION TIME (DT): 210.8 MS
ECHO MV E VELOCITY: 0.69 M/S
ECHO MV E/A RATIO: 1.23
ECHO MV E/E' LATERAL: 5.95
ECHO MV E/E' RATIO (AVERAGED): 6.46
ECHO MV E/E' SEPTAL: 6.97
ECHO RV TAPSE: 3.5 CM (ref 1.7–?)
ECHO TV REGURGITANT MAX VELOCITY: 2.21 M/S
ECHO TV REGURGITANT PEAK GRADIENT: 19 MMHG
EKG ATRIAL RATE: 74 BPM
EKG P AXIS: 73 DEGREES
EKG P-R INTERVAL: 120 MS
EKG Q-T INTERVAL: 366 MS
EKG QRS DURATION: 88 MS
EKG QTC CALCULATION (BAZETT): 406 MS
EKG R AXIS: 27 DEGREES
EKG T AXIS: 120 DEGREES
EKG VENTRICULAR RATE: 74 BPM
EOSINOPHIL # BLD: 0 K/UL (ref 0–0.7)
EOSINOPHIL NFR BLD: 0.1 %
ERYTHROCYTE [DISTWIDTH] IN BLOOD BY AUTOMATED COUNT: 13.2 % (ref 11.5–14.5)
GLUCOSE SERPL-MCNC: 150 MG/DL (ref 70–99)
HCT VFR BLD AUTO: 37.4 % (ref 37–47)
HCT VFR BLD AUTO: 38.2 % (ref 37–47)
HGB BLD-MCNC: 13 G/DL (ref 12–16)
HGB BLD-MCNC: 13.7 G/DL (ref 12–16)
INR PPP: 1.2
LYMPHOCYTES # BLD: 1.3 K/UL (ref 1–4.8)
LYMPHOCYTES NFR BLD: 8.6 %
MAGNESIUM SERPL-MCNC: 1.9 MG/DL (ref 1.7–2.4)
MCH RBC QN AUTO: 33.3 PG (ref 27–31.3)
MCHC RBC AUTO-ENTMCNC: 35.9 % (ref 33–37)
MCV RBC AUTO: 92.7 FL (ref 79.4–94.8)
MONOCYTES # BLD: 0.3 K/UL (ref 0.2–0.8)
MONOCYTES NFR BLD: 1.9 %
NEUTROPHILS # BLD: 13.3 K/UL (ref 1.4–6.5)
NEUTS SEG NFR BLD: 88.3 %
PLATELET # BLD AUTO: 434 K/UL (ref 130–400)
POTASSIUM SERPL-SCNC: 4.5 MEQ/L (ref 3.4–4.9)
PROTHROMBIN TIME: 15.5 SEC (ref 12.3–14.9)
RBC # BLD AUTO: 4.12 M/UL (ref 4.2–5.4)
SODIUM SERPL-SCNC: 132 MEQ/L (ref 135–144)
WBC # BLD AUTO: 15 K/UL (ref 4.8–10.8)

## 2024-10-30 PROCEDURE — 85025 COMPLETE CBC W/AUTO DIFF WBC: CPT

## 2024-10-30 PROCEDURE — 6360000002 HC RX W HCPCS: Performed by: INTERNAL MEDICINE

## 2024-10-30 PROCEDURE — 43235 EGD DIAGNOSTIC BRUSH WASH: CPT | Performed by: SPECIALIST

## 2024-10-30 PROCEDURE — 3609017100 HC EGD: Performed by: SPECIALIST

## 2024-10-30 PROCEDURE — 36415 COLL VENOUS BLD VENIPUNCTURE: CPT

## 2024-10-30 PROCEDURE — 93325 DOPPLER ECHO COLOR FLOW MAPG: CPT | Performed by: INTERNAL MEDICINE

## 2024-10-30 PROCEDURE — 2709999900 HC NON-CHARGEABLE SUPPLY: Performed by: SPECIALIST

## 2024-10-30 PROCEDURE — 3700000000 HC ANESTHESIA ATTENDED CARE: Performed by: SPECIALIST

## 2024-10-30 PROCEDURE — 85730 THROMBOPLASTIN TIME PARTIAL: CPT

## 2024-10-30 PROCEDURE — 83735 ASSAY OF MAGNESIUM: CPT

## 2024-10-30 PROCEDURE — 93321 DOPPLER ECHO F-UP/LMTD STD: CPT | Performed by: INTERNAL MEDICINE

## 2024-10-30 PROCEDURE — 85610 PROTHROMBIN TIME: CPT

## 2024-10-30 PROCEDURE — 6370000000 HC RX 637 (ALT 250 FOR IP): Performed by: SPECIALIST

## 2024-10-30 PROCEDURE — 2000000000 HC ICU R&B

## 2024-10-30 PROCEDURE — 93010 ELECTROCARDIOGRAM REPORT: CPT | Performed by: INTERNAL MEDICINE

## 2024-10-30 PROCEDURE — 99223 1ST HOSP IP/OBS HIGH 75: CPT | Performed by: INTERNAL MEDICINE

## 2024-10-30 PROCEDURE — 80048 BASIC METABOLIC PNL TOTAL CA: CPT

## 2024-10-30 PROCEDURE — 6370000000 HC RX 637 (ALT 250 FOR IP): Performed by: STUDENT IN AN ORGANIZED HEALTH CARE EDUCATION/TRAINING PROGRAM

## 2024-10-30 PROCEDURE — 6370000000 HC RX 637 (ALT 250 FOR IP): Performed by: INTERNAL MEDICINE

## 2024-10-30 PROCEDURE — 93308 TTE F-UP OR LMTD: CPT | Performed by: INTERNAL MEDICINE

## 2024-10-30 PROCEDURE — 74018 RADEX ABDOMEN 1 VIEW: CPT

## 2024-10-30 PROCEDURE — 99255 IP/OBS CONSLTJ NEW/EST HI 80: CPT | Performed by: INTERNAL MEDICINE

## 2024-10-30 PROCEDURE — 99233 SBSQ HOSP IP/OBS HIGH 50: CPT | Performed by: UROLOGY

## 2024-10-30 PROCEDURE — 99213 OFFICE O/P EST LOW 20 MIN: CPT

## 2024-10-30 PROCEDURE — 99291 CRITICAL CARE FIRST HOUR: CPT | Performed by: INTERNAL MEDICINE

## 2024-10-30 PROCEDURE — 85018 HEMOGLOBIN: CPT

## 2024-10-30 PROCEDURE — 2580000003 HC RX 258: Performed by: SPECIALIST

## 2024-10-30 PROCEDURE — 3700000001 HC ADD 15 MINUTES (ANESTHESIA): Performed by: SPECIALIST

## 2024-10-30 PROCEDURE — 93321 DOPPLER ECHO F-UP/LMTD STD: CPT

## 2024-10-30 PROCEDURE — 0DJ08ZZ INSPECTION OF UPPER INTESTINAL TRACT, VIA NATURAL OR ARTIFICIAL OPENING ENDOSCOPIC: ICD-10-PCS | Performed by: SPECIALIST

## 2024-10-30 PROCEDURE — 2580000003 HC RX 258: Performed by: INTERNAL MEDICINE

## 2024-10-30 PROCEDURE — 85014 HEMATOCRIT: CPT

## 2024-10-30 RX ORDER — SODIUM CHLORIDE 0.9 % (FLUSH) 0.9 %
5-40 SYRINGE (ML) INJECTION PRN
Status: CANCELLED | OUTPATIENT
Start: 2024-10-30

## 2024-10-30 RX ORDER — PANTOPRAZOLE SODIUM 40 MG/1
40 TABLET, DELAYED RELEASE ORAL
Status: DISCONTINUED | OUTPATIENT
Start: 2024-10-30 | End: 2024-10-31 | Stop reason: HOSPADM

## 2024-10-30 RX ORDER — SODIUM CHLORIDE 0.9 % (FLUSH) 0.9 %
5-40 SYRINGE (ML) INJECTION EVERY 12 HOURS SCHEDULED
Status: CANCELLED | OUTPATIENT
Start: 2024-10-30

## 2024-10-30 RX ORDER — SODIUM CHLORIDE 9 MG/ML
25 INJECTION, SOLUTION INTRAVENOUS PRN
Status: CANCELLED | OUTPATIENT
Start: 2024-10-30

## 2024-10-30 RX ORDER — EPINEPHRINE 1 MG/ML
INJECTION, SOLUTION, CONCENTRATE INTRAVENOUS
Status: DISCONTINUED
Start: 2024-10-30 | End: 2024-10-30 | Stop reason: WASHOUT

## 2024-10-30 RX ORDER — ETOMIDATE 2 MG/ML
INJECTION INTRAVENOUS
Status: DISPENSED
Start: 2024-10-30 | End: 2024-10-31

## 2024-10-30 RX ORDER — NOREPINEPHRINE BITARTRATE 0.06 MG/ML
1-100 INJECTION, SOLUTION INTRAVENOUS CONTINUOUS
Status: DISCONTINUED | OUTPATIENT
Start: 2024-10-30 | End: 2024-10-31

## 2024-10-30 RX ORDER — 0.9 % SODIUM CHLORIDE 0.9 %
500 INTRAVENOUS SOLUTION INTRAVENOUS ONCE
Status: COMPLETED | OUTPATIENT
Start: 2024-10-30 | End: 2024-10-30

## 2024-10-30 RX ADMIN — TIOTROPIUM BROMIDE INHALATION SPRAY 2 PUFF: 3.12 SPRAY, METERED RESPIRATORY (INHALATION) at 09:26

## 2024-10-30 RX ADMIN — SODIUM CHLORIDE, PRESERVATIVE FREE 10 ML: 5 INJECTION INTRAVENOUS at 21:29

## 2024-10-30 RX ADMIN — MIDODRINE HYDROCHLORIDE 10 MG: 10 TABLET ORAL at 11:50

## 2024-10-30 RX ADMIN — SODIUM CHLORIDE 500 ML: 9 INJECTION, SOLUTION INTRAVENOUS at 16:14

## 2024-10-30 RX ADMIN — MIDODRINE HYDROCHLORIDE 10 MG: 10 TABLET ORAL at 08:55

## 2024-10-30 RX ADMIN — SODIUM CHLORIDE, PRESERVATIVE FREE 10 ML: 5 INJECTION INTRAVENOUS at 08:56

## 2024-10-30 RX ADMIN — METHYLPREDNISOLONE SODIUM SUCCINATE 20 MG: 40 INJECTION INTRAMUSCULAR; INTRAVENOUS at 21:27

## 2024-10-30 RX ADMIN — ARIPIPRAZOLE 2 MG: 2 TABLET ORAL at 21:27

## 2024-10-30 RX ADMIN — MIDODRINE HYDROCHLORIDE 10 MG: 10 TABLET ORAL at 16:11

## 2024-10-30 RX ADMIN — GUAIFENESIN 600 MG: 600 TABLET ORAL at 21:27

## 2024-10-30 RX ADMIN — GUAIFENESIN 600 MG: 600 TABLET ORAL at 08:55

## 2024-10-30 RX ADMIN — SODIUM CHLORIDE, PRESERVATIVE FREE 40 MG: 5 INJECTION INTRAVENOUS at 08:55

## 2024-10-30 RX ADMIN — PANTOPRAZOLE SODIUM 40 MG: 40 TABLET, DELAYED RELEASE ORAL at 16:11

## 2024-10-30 RX ADMIN — ACETAMINOPHEN 325MG 650 MG: 325 TABLET ORAL at 21:35

## 2024-10-30 ASSESSMENT — ENCOUNTER SYMPTOMS
ABDOMINAL DISTENTION: 0
ABDOMINAL PAIN: 1
WHEEZING: 0
RESPIRATORY NEGATIVE: 1
STRIDOR: 0
COUGH: 0
CHEST TIGHTNESS: 0
BLOOD IN STOOL: 0
ABDOMINAL PAIN: 0
EYES NEGATIVE: 1
NAUSEA: 0
SHORTNESS OF BREATH: 0

## 2024-10-30 ASSESSMENT — PAIN SCALES - GENERAL
PAINLEVEL_OUTOF10: 4
PAINLEVEL_OUTOF10: 0

## 2024-10-30 ASSESSMENT — PAIN DESCRIPTION - LOCATION: LOCATION: SHOULDER

## 2024-10-30 ASSESSMENT — PAIN SCALES - WONG BAKER: WONGBAKER_NUMERICALRESPONSE: NO HURT

## 2024-10-30 ASSESSMENT — PAIN DESCRIPTION - DESCRIPTORS: DESCRIPTORS: ACHING;DISCOMFORT

## 2024-10-30 ASSESSMENT — PAIN DESCRIPTION - ORIENTATION: ORIENTATION: RIGHT;LEFT

## 2024-10-30 NOTE — CARE COORDINATION
Team ICU rounds done this am earlier. I met with patient after rounds to assess and ask if she had any dc needs. Pt was just dc'd on 10/25 with Lifevest and O2. She returned d/t vomiting blood she states. Pt was supposed to stop ASA but did not. She does not have any current services and does not feel she needs any. She did not have any questions or concerns for CM or SW.

## 2024-10-30 NOTE — PROGRESS NOTES
Progress Note    10/30/2024   9:09 AM    Name:  Jhoana London  MRN:    [id number]     Acct:     [id number]   Room:  [address] Day: 1     Admit Date: 10/29/2024 10:32 AM  PCP: Elizabeth Crain MD    Subjective:     C/C:   Chief Complaint   Patient presents with    Hematemesis     Pt c/o vomiting blood x 1 this am       Interval History: Status: This is a 55 yo female with h/o Anxiety, Asthma, COPD, and admitted for hematemesis and being evaluated by GI. She was transferred to ICU for hypotension. She had a Ct showing a 7 mm Rt UVJ stone with mild hydronephrosis. She has no prior h/o stones and currently denies flank or abd pain or N/V. She has no voiding complaints. She has no fever or chills.     Past Medical History:   Diagnosis Date    Acid reflux     Anxiety     Asthma     COPD (chronic obstructive pulmonary disease) (HCC)     Crohn's colitis (HCC)     Ejection fraction < 50%     Heart failure (HCC)     PTSD (post-traumatic stress disorder)     Smoker        ROS:  Review of Systems   Constitutional:  Negative for fever.   Gastrointestinal:  Negative for abdominal distention and abdominal pain.   Genitourinary:  Negative for dysuria, flank pain and hematuria.       Medications:     Allergies:   Allergies   Allergen Reactions    Penicillins     Seasonal        Current Meds: ARIPiprazole (ABILIFY) tablet 2 mg, Daily  atorvastatin (LIPITOR) tablet 20 mg, Nightly  tiotropium (SPIRIVA RESPIMAT) 2.5 MCG/ACT inhaler 2 puff, Daily RT  [Held by provider] metoprolol succinate (TOPROL XL) extended release tablet 25 mg, Daily  sodium chloride flush 0.9 % injection 5-40 mL, 2 times per day  sodium chloride flush 0.9 % injection 5-40 mL, PRN  0.9 % sodium chloride infusion, PRN  ondansetron (ZOFRAN-ODT) disintegrating tablet 4 mg, Q8H PRN   Or  ondansetron (ZOFRAN) injection 4 mg, Q6H PRN  acetaminophen (TYLENOL) tablet 650 mg, Q6H PRN   Or  acetaminophen (TYLENOL) suppository 650 mg, Q6H PRN  guaiFENesin (MUCINEX)  extended release tablet 600 mg, BID  pantoprazole (PROTONIX) 40 mg in sodium chloride (PF) 0.9 % 10 mL injection, Q12H  midodrine (PROAMATINE) tablet 10 mg, TID WC  norepinephrine (LEVOPHED) 16 mg in sodium chloride 0.9 % 250 mL infusion, Continuous  methylPREDNISolone sodium succ (SOLU-MEDROL) injection 20 mg, Daily        Data:     Code Status:  Full Code    History reviewed. No pertinent family history.    Social History     Socioeconomic History    Marital status:      Spouse name: Not on file    Number of children: Not on file    Years of education: Not on file    Highest education level: Not on file   Occupational History    Not on file   Tobacco Use    Smoking status: Every Day     Types: Cigarettes     Passive exposure: Current    Smokeless tobacco: Not on file   Substance and Sexual Activity    Alcohol use: Not Currently    Drug use: Yes     Types: Marijuana (Weed)     Comment: edible    Sexual activity: Not on file   Other Topics Concern    Not on file   Social History Narrative    Not on file     Social Determinants of Health     Financial Resource Strain: Medium Risk (1/12/2024)    Received from [facility]    Overall Financial Resource Strain (CARDIA)     Difficulty of Paying Living Expenses: Somewhat hard   Food Insecurity: No Food Insecurity (10/29/2024)    Hunger Vital Sign     Worried About Running Out of Food in the Last Year: Never true     Ran Out of Food in the Last Year: Never true   Transportation Needs: No Transportation Needs (10/29/2024)    PRAPARE - Transportation     Lack of Transportation (Medical): No     Lack of Transportation (Non-Medical): No   Physical Activity: Not on File (5/20/2021)    Received from [facility]    Physical Activity     Physical Activity: 0   Stress: Not on File (5/20/2021)    Received from [facility]    Stress     Stress: 0   Social Connections: Moderately Integrated (10/30/2024)    Social Connections ([facility] HRSN)     If for any reason you need help with

## 2024-10-30 NOTE — PROGRESS NOTES
Pt is aox4 pd even unlabored respirations.  Pt states she is feeling good today and is ready to eat food and go home.  Pt  has dry intact dressing to RLE , states she has had this for some time and is it healing properly.

## 2024-10-30 NOTE — PROGRESS NOTES
Physician Progress Note      PATIENT:               BRE GORDON  [facility] #:                  [id number]  :                       1970  ADMIT DATE:       10/29/2024 10:32 AM  DISCH DATE:  RESPONDING  PROVIDER #:        Cortez Brito MD          QUERY TEXT:    Patient admitted with GIB. Noted documentation ofNSTEMI in consult note by Dr. Brito on 10/29. In order to support the diagnosis of NSTEMI, please include   additional clinical indicators in your documentation.  Or please document if   the diagnosis of NSTEMI has been ruled out after further study.    The medical record reflects the following:  Risk Factors: GIB, cardiomyopathy, hydronephrosis, hypotension  Clinical Indicators: troponin 42, EKG Normal sinus rhythm Nonspecific ST and T   wave abnormality, absence of chest pain  Treatment: lab monitoring, EKG, cardiology consult  Options provided:  -- NSTEMI present as evidenced by, Please document evidence.  -- NSTEMI was ruled out  -- Other - I will add my own diagnosis  -- Disagree - Not applicable / Not valid  -- Disagree - Clinically unable to determine / Unknown  -- Refer to Clinical Documentation Reviewer    PROVIDER RESPONSE TEXT:    H/o NSEMI, i did addendum in consultation    Query created by: Conchita Gunderson on 10/30/2024 9:46 AM      Electronically signed by:  Cortez Brito MD 10/30/2024 12:53 PM

## 2024-10-30 NOTE — PROGRESS NOTES
Wound Ostomy Continence Nurse  Consult Note       NAME:  Jhoana London  MEDICAL RECORD NUMBER:  [id number]  AGE: 54 y.o.   GENDER: female  : 1970  TODAY'S DATE:  10/30/2024    Subjective   Reason for [facility] Nurse Evaluation and Assessment: RLE and coccyx      Jhoana London is a 54 y.o. female referred by:   [] Physician  [x] Nursing  [] Other:     Wound Identification:  Wound Type: pressure and traumatic  Contributing Factors: chronic pressure, decreased mobility, malnutrition, and trauma to RLE    Wound History: Patient admitted to [facility] on 10/29/2024 with traumatic wound to RLE and stage 2 pressure injury to coccyx. Patient states she follows monthly with [facility] for RLE wound. She originally obtained the wound by hitting leg on \"something\" and put off treatment for a while. Patient is very familiar with dressing change routine.   Current Wound Care Treatment:  Recommending 1) continue pressure injury prevention interventions including low air loss mattress 2) zinc to coccyx bid and prn 3) daily dressing changes to RLE - see orders    Patient Goal of Care:  [x] Wound Healing  [] Odor Control  [] Palliative Care  [] Pain Control   [] Other:         PAST MEDICAL HISTORY        Diagnosis Date    Acid reflux     Anxiety     Asthma     COPD (chronic obstructive pulmonary disease) (HCC)     Crohn's colitis (HCC)     Ejection fraction < 50%     Heart failure (HCC)     PTSD (post-traumatic stress disorder)     Smoker        PAST SURGICAL HISTORY    Past Surgical History:   Procedure Laterality Date    CARDIAC PROCEDURE N/A 10/23/2024    Left heart cath / coronary angiography performed by Fred Avila DO at [facility] CARDIAC CATH LAB    CHOLECYSTECTOMY      HIATAL HERNIA REPAIR      TONSILLECTOMY         FAMILY HISTORY    History reviewed. No pertinent family history.    SOCIAL HISTORY    Social History     Tobacco Use    Smoking status: Every Day     Types: Cigarettes     Passive

## 2024-10-30 NOTE — PROGRESS NOTES
Dr Riley gave verbal orders for \"protonix 40 mg po bid - dc protonix 40 mg IV . Pt can have soft diet and cleared for discharge from his stand point\"

## 2024-10-30 NOTE — PROGRESS NOTES
Critical Care Progress Note    10/30/2024 4:26 PM    Subjective:   Admit Date: 10/29/2024  PCP: Elizabeth Crain MD    Chief Complaint   Patient presents with    Hematemesis     Pt c/o vomiting blood x 1 this am     Interval History: Transferred last night to the ICU due to hypotension.  Has been started on Levophed.  Was found to have an upper GI bleeding.  Blood has been transfused.  She is currently NPO.  GI has been contacted.  Plan for endoscopy later today.       Medications:   Scheduled Meds:   etomidate        pantoprazole  40 mg Oral BID AC    sodium chloride  500 mL IntraVENous Once    ARIPiprazole  2 mg Oral Daily    atorvastatin  20 mg Oral Nightly    tiotropium  2 puff Inhalation Daily RT    [Held by provider] metoprolol succinate  25 mg Oral Daily    sodium chloride flush  5-40 mL IntraVENous 2 times per day    guaiFENesin  600 mg Oral BID    midodrine  10 mg Oral TID WC    methylPREDNISolone  20 mg IntraVENous Daily     Continuous Infusions:   norepinephrine-sodium chloride 9 mcg/min (10/30/24 1449)    sodium chloride           Objective:   Vitals:   Temp (24hrs), Av.2 °F (36.8 °C), Min:98 °F (36.7 °C), Max:98.4 °F (36.9 °C)    BP (!) 86/38   Pulse 61   Temp 98 °F (36.7 °C)   Resp 17   Ht 1.651 m (5' 5\")   Wt 51.7 kg (114 lb)   SpO2 97%   BMI 18.97 kg/m²   I/O:24HR INTAKE/OUTPUT:    Intake/Output Summary (Last 24 hours) at 10/30/2024 1626  Last data filed at 10/30/2024 1452  Gross per 24 hour   Intake 297.01 ml   Output 450 ml   Net -152.99 ml     10/29 0701 - 10/30 0700  In: 967.2 [I.V.:21]  Out: 450 [Urine:450]  CVP:          Physical Exam:  General appearance - alert, ill appearing, and in mild distress  Mental status - alert, oriented to person, place, and time  Eyes - pupils equal and reactive, extraocular eye movements intact  Nose - normal and patent, no erythema, discharge or polyps  Neck - supple, no significant adenopathy  Chest - clear to auscultation, no wheezes   Heart - normal

## 2024-10-30 NOTE — CONSULTS
Inpatient consult to Cardiology  Consult performed by: Zion Jeffries MD  Consult ordered by: Ambrosio Rausch DO          Patient Name: Jhoana London  Admit Date: 10/29/2024 10:32 AM  MR #: [id number]  : 1970    Attending Physician: Ambrosio Rausch DO  Reason for consult: CV care    History of Presenting Illness:      Jhoana London is a 54 y.o. female on hospital day 1 with a history of .   History Obtained From:  patient, electronic medical record    53 yo female with PAD CAD PAF NICM Crohn's recently discharged after NSTEMI.  She presents with 1 episode Hematemesis.  She has heartburn sensation relieved with Protonix.  Hb is stable.     She has MIld CAD.  PAD and had been on DAPT w Plavix.  On last admit she had a bout of Rapid AF which converted Spontaneously to SR.  ELiquis was started.  Plavix continued but ASA stopped.     For NICM EF 20% a Life Vest was ordered prior to recent DC.     She has no CP nor SOB.   History:      EKG:  Past Medical History:   Diagnosis Date    Acid reflux     Anxiety     Asthma     COPD (chronic obstructive pulmonary disease) (HCC)     Crohn's colitis (HCC)     Ejection fraction < 50%     Heart failure (HCC)     PTSD (post-traumatic stress disorder)     Smoker      Past Surgical History:   Procedure Laterality Date    CARDIAC PROCEDURE N/A 10/23/2024    Left heart cath / coronary angiography performed [place]ila DO [place] [facility] [place] [facility]    CHOLECYSTECTOMY      HIATAL HERNIA REPAIR      TONSILLECTOMY         Family History  History reviewed. No pertinent family history.  [] Unable to obtain due to ventilated and/ or neurologic status    Social History     Socioeconomic History    Marital status:      Spouse name: Not on file    Number of children: Not on file    Years of education: Not on file    Highest education level: Not on file   Occupational History    Not on file   Tobacco Use    Smoking status: Every Day     Types: Cigarettes     Passive exposure:  scarring, but each having a nonspecific nodular area, up to 1 cm size such that neoplasm is possible.  Per Fleischner society guidelines, consider a noncontrast chest CT at 3 months, a PET-CT, or tissue sampling.     XR CHEST (2 VW)    Result Date: 10/22/2024  EXAMINATION: TWO XRAY VIEWS OF THE CHEST 10/22/2024 7:05 pm COMPARISON: None. HISTORY: ORDERING SYSTEM PROVIDED HISTORY: SOB TECHNOLOGIST PROVIDED HISTORY: Reason for exam:->SOB What reading provider will be dictating this exam?->CRC FINDINGS: The lungs are hyperaerated without acute focal process.  There is no effusion or pneumothorax. The cardiomediastinal silhouette is without acute process. The osseous structures are without acute process.     No acute process. COPD.       Active Hospital Problems    Diagnosis Date Noted    Hypotension [I95.9] 10/30/2024     Priority: Low    Postobstructive pneumonia [J18.9] 10/30/2024     Priority: Low    Gastrointestinal hemorrhage [K92.2] 10/29/2024     Priority: Low    Ureteric stone [N20.1] 10/29/2024     Priority: Low         Impression/Plan:   UGI Bleed - proceed with GI Evaluation.  Hold Plavix and Eliquis.   CAD - Mild D1 50%. - will resume CV meds when able.   Hypotension - placed on Midodrine overnight. She was on Midodrine int he past as well.   Wean Midodrine as much as possible.   PAF - in SR. Hold Eliquis.   NICM EF 20% Life vest until future w/u for ICD is complete.  PAD - stable with no claudication.   HPL - resume Statin  Crohn's     Thank you for allowing us to participate in the care of this patient.     Will continue to follow.    Please call if questions or concerns arise.    Electronically signed by EVERT ALDRIDGE MD on 10/30/2024 at 8:40 AM

## 2024-10-30 NOTE — PROGRESS NOTES
Pt passed swallow screening and is now eating soft diet in bed.  Pt is aox4 pwd states her blood pressure is usally 90's this is normal for her.

## 2024-10-30 NOTE — PROGRESS NOTES
Physician Progress Note    10/30/2024   3:51 PM    Name:  Jhoana London  MRN:    [id number]     IP Day: 1     Admit Date: 10/29/2024 10:32 AM  PCP: Elizabeth Crain MD    Code Status:  Full Code    Assessment and Plan:        1.  Upper GI bleed: Resolved  -EGD completed.  Awaiting results  -On PPI  -Resumption of antiplatelet and anticoagulation per cardiologist    2.  Hypotension, cause unclear.  Patient is asymptomatic  -Reviewed TTE-EF has recovered  -Midodrine 3 times daily.  Wean norepinephrine as able  -Additional IVF bolus    3.  History of Crohn's disease on prednisone:  -Continue with increased dose for now 20 mg daily    Mucous plugging on CT chest: Continue supportive care  Incidental finding 7 mm stone urology following  Paroxysmal atrial fibrillation  History of nonischemic cardiomyopathy with recovered EF    Diet: ADULT DIET; Dysphagia - Soft and Bite Sized  Full Code    Electronically signed by Ambrosio Rausch DO on 10/30/2024 at 3:51 PM    Subjective:     On norepinephrine but asymptomatic    Current Facility-Administered Medications   Medication Dose Route Frequency Provider Last Rate Last Admin    norepinephrine (LEVOPHED) 16 mg in sodium chloride 0.9 % 250 mL infusion  1-100 mcg/min IntraVENous Continuous Joycelyn Dutton MD 8.4 mL/hr at 10/30/24 1449 9 mcg/min at 10/30/24 1449    etomidate (AMIDATE) 2 MG/ML injection             pantoprazole (PROTONIX) tablet 40 mg  40 mg Oral BID AC Nahid Riley MD        ARIPiprazole (ABILIFY) tablet 2 mg  2 mg Oral Daily Ambrosio Rausch DO   2 mg at 10/29/24 2231    atorvastatin (LIPITOR) tablet 20 mg  20 mg Oral Nightly Ambrosio Rausch DO   20 mg at 10/29/24 2231    tiotropium (SPIRIVA RESPIMAT) 2.5 MCG/ACT inhaler 2 puff  2 puff Inhalation Daily RT Ambrosio Rausch DO   2 puff at 10/30/24 0926    [Held by provider] metoprolol succinate (TOPROL XL) extended release tablet 25 mg  25 mg Oral Daily Ambrosio Rausch DO        sodium chloride flush 0.9 %  injection 5-40 mL  5-40 mL IntraVENous 2 times per day Ambrosio Rausch DO   10 mL at 10/30/24 0856    sodium chloride flush 0.9 % injection 5-40 mL  5-40 mL IntraVENous PRN Ambrosio Rausch, DO        0.9 % sodium chloride infusion   IntraVENous PRN Ambrosio Rausch R, DO        ondansetron (ZOFRAN-ODT) disintegrating tablet 4 mg  4 mg Oral Q8H PRN Ambrosio Rausch,         Or    ondansetron (ZOFRAN) injection 4 mg  4 mg IntraVENous Q6H PRN Ambrosio Rausch, DO        acetaminophen (TYLENOL) tablet 650 mg  650 mg Oral Q6H PRN Ambrosio Rausch,         Or    acetaminophen (TYLENOL) suppository 650 mg  650 mg Rectal Q6H PRN Shalom, Neal R, DO        guaiFENesin (MUCINEX) extended release tablet 600 mg  600 mg Oral BID Ambrosio Rausch DO   600 mg at 10/30/24 0855    midodrine (PROAMATINE) tablet 10 mg  10 mg Oral TID  Simon Pringle MD   10 mg at 10/30/24 1150    methylPREDNISolone sodium succ (SOLU-MEDROL) injection 20 mg  20 mg IntraVENous Daily Ambrosio Rausch DO           Physical Examination:      Vitals:  BP (!) 95/33   Pulse 55   Temp 98 °F (36.7 °C)   Resp 18   Ht 1.651 m (5' 5\")   Wt 51.7 kg (114 lb)   SpO2 100%   BMI 18.97 kg/m²   Temp (24hrs), Av.2 °F (36.8 °C), Min:98 °F (36.7 °C), Max:98.4 °F (36.9 °C)      General appearance: alert, cooperative and no distress.  Very thin  Mental Status: oriented to person, place and time and normal affect  Lungs: clear to auscultation bilaterally, normal effort  Heart: regular rate and rhythm, no murmur  Abdomen: soft, nontender, nondistended, bowel sounds present, no masses  Extremities: no edema, redness, tenderness in the calves. Cap refill <2s  Skin: no gross lesions, rashes    Data:     Labs:  Recent Labs     10/29/24  1109 10/29/24  1852 10/30/24  0527 10/30/24  1515   WBC 16.7*  --  15.0*  --    HGB 16.2*   < > 13.7 13.0   *  --  434*  --     < > = values in this interval not displayed.     Recent Labs     10/29/24  1109

## 2024-10-30 NOTE — ANESTHESIA POSTPROCEDURE EVALUATION
Department of Anesthesiology  Postprocedure Note    Patient: Jhoana London  MRN: [id number]  YOB: 1970  Date of evaluation: 10/30/2024    Procedure Summary       Date: 10/30/24 Room / Location: [facility] OR 02 / [facility]    Anesthesia Start: 1344 Anesthesia Stop: 1413    Procedure: ESOPHAGOGASTRODUODENOSCOPY Diagnosis:       GI bleeding      (GI bleeding [K92.2])    Surgeons: Nahid Riley MD Responsible Provider: Nicol Zacarias APRN - CRNA    Anesthesia Type: MAC ASA Status: 3            Anesthesia Type: No value filed.    Andreas Phase I:      Andreas Phase II:      Anesthesia Post Evaluation    Patient location during evaluation: bedside  Patient participation: complete - patient participated  Level of consciousness: awake and awake and alert  Pain score: 0  Airway patency: patent  Nausea & Vomiting: no nausea and no vomiting  Cardiovascular status: blood pressure returned to baseline and hemodynamically stable  Respiratory status: acceptable  Hydration status: euvolemic  Pain management: adequate        No notable events documented.

## 2024-10-30 NOTE — PLAN OF CARE
Problem: Discharge Planning  Goal: Discharge to home or other facility with appropriate resources  10/30/2024 0823 by Aric Ochoa, RN  Outcome: Progressing  Flowsheets (Taken 10/29/2024 2300 by Kalina Gay, RN)  Discharge to home or other facility with appropriate resources:   Identify barriers to discharge with patient and caregiver   Arrange for needed discharge resources and transportation as appropriate   Identify discharge learning needs (meds, wound care, etc)  10/29/2024 1830 by Giulia Bautista, RN  Outcome: Progressing     Problem: Safety - Adult  Goal: Free from fall injury  Outcome: Progressing

## 2024-10-30 NOTE — PROGRESS NOTES
Pt was incontinent of urine, pt given bed bath with another Nicol RN. Pt has con left ac IV discomfort and requested IV be changed , nurse flushed IV without difficulty and pt stated its did not hurt to flush but wanted changed.  Nurse removed left AC IV catheter intact bleeding controlled no redness noted.   New IV 20 G placed in pt's right AC blood return noted and flushed.  Pt tolerated well and states she feels much better.   Pt has Levophed in L AC nurses spoke to pharmacists who advised warm compress and monitor for now. Nurse will pass on to night charge and night nurse to monitor closely.   Warm compress applied pt aware to monitor for increase pain as well.

## 2024-10-30 NOTE — PROGRESS NOTES
Pt hypotensive IV bolus running and nurse titrated drip to 11 mcg/min.  Pt is sitting up in bed aox4 pwd even unlabored respirations.  Pt states to nurse I have low blood pressure this is normal for me.

## 2024-10-30 NOTE — ANESTHESIA PRE PROCEDURE
Department of Anesthesiology  Preprocedure Note       Name:  Jhoana London   Age:  54 y.o.  :  1970                                          MRN:  [id number]         Date:  10/30/2024      Surgeon: Surgeon(s):  Nahid Riley MD    Procedure: Procedure(s):  ESOPHAGOGASTRODUODENOSCOPY    Medications prior to admission:   Prior to Admission medications    Medication Sig Start Date End Date Taking? Authorizing Provider   aspirin 325 MG tablet Take 1 tablet by mouth daily   Yes Provider, MD Kash   tiotropium (SPIRIVA) 18 MCG inhalation capsule Inhale 1 capsule into the lungs daily   Yes Provider, MD Kash   DULoxetine (CYMBALTA) 20 MG extended release capsule Take 1 capsule by mouth daily   Yes Provider, MD Kash   diphenhydrAMINE (BANOPHEN) 25 MG capsule Take 1 capsule by mouth nightly as needed   Yes Provider, MD Kash   guaiFENesin (MUCINEX) 600 MG extended release tablet Take 2 tablets by mouth 2 times daily as needed   Yes Provider, MD Kash   ibuprofen (ADVIL;MOTRIN) 600 MG tablet Take 1 tablet by mouth every 6 hours as needed for Pain 24  Yes Provider, MD Kash   ergocalciferol (ERGOCALCIFEROL) 1.25 MG (67209 UT) capsule 1 capsule once a week   Yes Provider, MD Kash   apixaban (ELIQUIS) 5 MG TABS tablet Take 1 tablet by mouth 2 times daily 10/25/24  Yes Zion Jeffries MD   albuterol sulfate HFA (VENTOLIN HFA) 108 (90 Base) MCG/ACT inhaler Inhale 2 puffs into the lungs 4 times daily as needed for Wheezing 10/25/24  Yes Gabino Leach MD   metoprolol succinate (TOPROL XL) 25 MG extended release tablet Take 1 tablet by mouth daily 10/24/24  Yes Zion Jeffries MD   sacubitril-valsartan (ENTRESTO) 24-26 MG per tablet Take 1 tablet by mouth 2 times daily 10/24/24  Yes Zion Jeffries MD   aluminum & magnesium hydroxide-simethicone (MAALOX) 200-200-20 MG/5ML SUSP suspension Take 30 mLs by mouth every 6 hours as needed for Indigestion 10/24/24  Yes Simon Pringle,

## 2024-10-30 NOTE — CARE COORDINATION
Case Management Assessment  Initial Evaluation    Date/Time of Evaluation: 10/29/2024 9:49 PM  Assessment Completed by: Trish Morales RN    If patient is discharged prior to next notation, then this note serves as note for discharge by case management.    Patient Name: Jhoana London                   YOB: 1970  Diagnosis: Ureteric stone [N20.1]  Upper GI bleed [K92.2]  Postobstructive pneumonia [J18.9]  Gastrointestinal hemorrhage, unspecified gastrointestinal hemorrhage type [K92.2]  Cyst of ovary, unspecified laterality [N83.209]                   Date / Time: 10/29/2024 10:32 AM    Patient Admission Status: Inpatient   Readmission Risk (Low < 19, Mod (19-27), High > 27): Readmission Risk Score: 15.7    Current PCP: Elizabeth Crain MD  PCP verified by CM? Yes    Chart Reviewed: Yes      History Provided by: Patient  Patient Orientation: Alert and Oriented, Person, Place, Situation, Self    Patient Cognition: Alert    Hospitalization in the last 30 days (Readmission):  No    If yes, Readmission Assessment in  Navigator will be completed.    Advance Directives:      Code Status: Full Code   Patient's Primary Decision Maker is: Legal Next of Kin (children Dorota, Bettye and Saad.)      Discharge Planning:    Patient lives with: Children Type of Home: House  Primary Care Giver: Self  Patient Support Systems include: Children   Current Financial resources: Medicaid  Current community resources: None  Current services prior to admission: Durable Medical Equipment            Current DME: Home Aerosol, Oxygen Therapy (Comment)            Type of Home Care services:  (P) None    ADLS  Prior functional level: Independent in ADLs/IADLs  Current functional level: Independent in ADLs/IADLs    PT AM-PAC:   /24  OT AM-PAC:   /24    Family can provide assistance at DC: Yes  Would you like Case Management to discuss the discharge plan with any other family members/significant others, and if so, who? No  Plans  Discharge Plan?      Trish Morales RN  Case Management Department

## 2024-10-30 NOTE — PROGRESS NOTES
Pt is aox4 pwd even unlabored respirations.  Nurse spoke to Dr Riley who advised keep pt NPO he will do endoscopy today.  Pt updated and aware.

## 2024-10-30 NOTE — PROGRESS NOTES
Pt is aox4 pwd even unlabored respirations  Pt states she is feeling good and ready to go home.  Nurse explained pt blood pressure is low, Dr gave new orders .  Pt verbalized understanding

## 2024-10-30 NOTE — PROGRESS NOTES
Spiritual Health History and Assessment/Progress Note  [facility]    Initial Encounter,  ,  ,      Name: Jhoana London MRN: [id number]    Age: 54 y.o.     Sex: female   Language: English   Mosque: None   Gastrointestinal hemorrhage     Date: 10/30/2024            Total Time Calculated: 15 min              Spiritual Assessment began in [facility] ICU        Referral/Consult From: Rounding   Encounter Overview/Reason: Initial Encounter  Service Provided For: Patient    Cassidy, Belief, Meaning:   Patient has beliefs or practices that help with coping during difficult times  Family/Friends No family/friends present      Importance and Influence:  Patient has spiritual/personal beliefs that influence decisions regarding their health  Family/Friends No family/friends present    Community:  Patient feels well-supported. Support system includes: Children, Friends, and Extended family  Family/Friends No family/friends present    Assessment and Plan of Care:     Patient Interventions include: Affirmed coping skills/support systems  Family/Friends Interventions include: No family/friends present    Patient Plan of Care: Spiritual Care available upon further referral  Family/Friends Plan of Care: No family/friends present    Electronically signed by Chaplain Ashlyn on 10/30/2024 at 11:27 AM

## 2024-10-30 NOTE — FLOWSHEET NOTE
Shift Summary:    Pt arrived to ICU from [facility] for hypotension, initial blood pressure upon arrival to unit was 78/44, Levophed started and report received from [facility] RN. Pt remains on low dose pressor support. No abd. Pain N/v noted by patient for remainder of shift.

## 2024-10-30 NOTE — ACP (ADVANCE CARE PLANNING)
Advance Care Planning     Advance Care Planning Activator (Inpatient)  Conversation Note      Date of ACP Conversation: 10/29/2024     Conversation Conducted with: Patient with Decision Making Capacity    ACP Activator: Trish Morales, RN        Health Care Decision Maker:     Current Designated Health Care Decision Maker:     Primary Decision Maker: Dorota London - Child - [phone]    Primary Decision Maker: Bettye Vargas - Child - [phone]    Primary Decision Maker: Saad London - Child - [phone]

## 2024-10-31 VITALS
RESPIRATION RATE: 24 BRPM | HEIGHT: 65 IN | WEIGHT: 106 LBS | TEMPERATURE: 97.8 F | DIASTOLIC BLOOD PRESSURE: 41 MMHG | BODY MASS INDEX: 17.66 KG/M2 | OXYGEN SATURATION: 95 % | HEART RATE: 54 BPM | SYSTOLIC BLOOD PRESSURE: 107 MMHG

## 2024-10-31 PROBLEM — E44.0 MODERATE MALNUTRITION (HCC): Status: ACTIVE | Noted: 2024-10-31

## 2024-10-31 LAB
ANION GAP SERPL CALCULATED.3IONS-SCNC: 10 MEQ/L (ref 9–15)
BASOPHILS # BLD: 0.1 K/UL (ref 0–0.2)
BASOPHILS NFR BLD: 0.3 %
BUN SERPL-MCNC: 12 MG/DL (ref 6–20)
CALCIUM SERPL-MCNC: 7.6 MG/DL (ref 8.5–9.9)
CHLORIDE SERPL-SCNC: 104 MEQ/L (ref 95–107)
CO2 SERPL-SCNC: 21 MEQ/L (ref 20–31)
CREAT SERPL-MCNC: 0.75 MG/DL (ref 0.5–0.9)
EOSINOPHIL # BLD: 0.1 K/UL (ref 0–0.7)
EOSINOPHIL NFR BLD: 0.6 %
ERYTHROCYTE [DISTWIDTH] IN BLOOD BY AUTOMATED COUNT: 13.3 % (ref 11.5–14.5)
GLUCOSE SERPL-MCNC: 149 MG/DL (ref 70–99)
HCT VFR BLD AUTO: 32.5 % (ref 37–47)
HCT VFR BLD AUTO: 35.1 % (ref 37–47)
HGB BLD-MCNC: 11.3 G/DL (ref 12–16)
HGB BLD-MCNC: 12.2 G/DL (ref 12–16)
LYMPHOCYTES # BLD: 2.6 K/UL (ref 1–4.8)
LYMPHOCYTES NFR BLD: 14.8 %
MAGNESIUM SERPL-MCNC: 1.7 MG/DL (ref 1.7–2.4)
MCH RBC QN AUTO: 31.9 PG (ref 27–31.3)
MCHC RBC AUTO-ENTMCNC: 34.8 % (ref 33–37)
MCV RBC AUTO: 91.6 FL (ref 79.4–94.8)
MONOCYTES # BLD: 1.2 K/UL (ref 0.2–0.8)
MONOCYTES NFR BLD: 6.6 %
NEUTROPHILS # BLD: 13.6 K/UL (ref 1.4–6.5)
NEUTS SEG NFR BLD: 77.1 %
PLATELET # BLD AUTO: 398 K/UL (ref 130–400)
POTASSIUM SERPL-SCNC: 4.2 MEQ/L (ref 3.4–4.9)
RBC # BLD AUTO: 3.83 M/UL (ref 4.2–5.4)
SODIUM SERPL-SCNC: 135 MEQ/L (ref 135–144)
WBC # BLD AUTO: 17.6 K/UL (ref 4.8–10.8)

## 2024-10-31 PROCEDURE — 6370000000 HC RX 637 (ALT 250 FOR IP): Performed by: INTERNAL MEDICINE

## 2024-10-31 PROCEDURE — 83735 ASSAY OF MAGNESIUM: CPT

## 2024-10-31 PROCEDURE — 94761 N-INVAS EAR/PLS OXIMETRY MLT: CPT

## 2024-10-31 PROCEDURE — 6370000000 HC RX 637 (ALT 250 FOR IP): Performed by: SPECIALIST

## 2024-10-31 PROCEDURE — 99233 SBSQ HOSP IP/OBS HIGH 50: CPT | Performed by: UROLOGY

## 2024-10-31 PROCEDURE — 2580000003 HC RX 258: Performed by: INTERNAL MEDICINE

## 2024-10-31 PROCEDURE — 85018 HEMOGLOBIN: CPT

## 2024-10-31 PROCEDURE — 36415 COLL VENOUS BLD VENIPUNCTURE: CPT

## 2024-10-31 PROCEDURE — 99291 CRITICAL CARE FIRST HOUR: CPT | Performed by: INTERNAL MEDICINE

## 2024-10-31 PROCEDURE — 99233 SBSQ HOSP IP/OBS HIGH 50: CPT | Performed by: INTERNAL MEDICINE

## 2024-10-31 PROCEDURE — 85014 HEMATOCRIT: CPT

## 2024-10-31 PROCEDURE — 2500000003 HC RX 250 WO HCPCS: Performed by: INTERNAL MEDICINE

## 2024-10-31 PROCEDURE — 85025 COMPLETE CBC W/AUTO DIFF WBC: CPT

## 2024-10-31 PROCEDURE — 6370000000 HC RX 637 (ALT 250 FOR IP): Performed by: NURSE PRACTITIONER

## 2024-10-31 PROCEDURE — 6370000000 HC RX 637 (ALT 250 FOR IP): Performed by: STUDENT IN AN ORGANIZED HEALTH CARE EDUCATION/TRAINING PROGRAM

## 2024-10-31 PROCEDURE — 80048 BASIC METABOLIC PNL TOTAL CA: CPT

## 2024-10-31 PROCEDURE — 94640 AIRWAY INHALATION TREATMENT: CPT

## 2024-10-31 RX ORDER — MIDODRINE HYDROCHLORIDE 10 MG/1
20 TABLET ORAL
Qty: 180 TABLET | Refills: 0 | Status: SHIPPED | OUTPATIENT
Start: 2024-11-01

## 2024-10-31 RX ORDER — MIDODRINE HYDROCHLORIDE 10 MG/1
20 TABLET ORAL
Status: DISCONTINUED | OUTPATIENT
Start: 2024-10-31 | End: 2024-10-31 | Stop reason: HOSPADM

## 2024-10-31 RX ORDER — MIDODRINE HYDROCHLORIDE 10 MG/1
10 TABLET ORAL ONCE
Status: COMPLETED | OUTPATIENT
Start: 2024-10-31 | End: 2024-10-31

## 2024-10-31 RX ORDER — PREDNISONE 10 MG/1
10 TABLET ORAL DAILY
Status: DISCONTINUED | OUTPATIENT
Start: 2024-10-31 | End: 2024-10-31 | Stop reason: HOSPADM

## 2024-10-31 RX ORDER — PANTOPRAZOLE SODIUM 40 MG/1
40 TABLET, DELAYED RELEASE ORAL
Qty: 60 TABLET | Refills: 1 | Status: SHIPPED | OUTPATIENT
Start: 2024-10-31

## 2024-10-31 RX ORDER — DULOXETIN HYDROCHLORIDE 20 MG/1
20 CAPSULE, DELAYED RELEASE ORAL DAILY
Status: DISCONTINUED | OUTPATIENT
Start: 2024-10-31 | End: 2024-10-31 | Stop reason: HOSPADM

## 2024-10-31 RX ADMIN — SODIUM CHLORIDE, PRESERVATIVE FREE 10 ML: 5 INJECTION INTRAVENOUS at 08:11

## 2024-10-31 RX ADMIN — NOREPINEPHRINE BITARTRATE 9 MCG/MIN: 64 SOLUTION INTRAVENOUS at 07:35

## 2024-10-31 RX ADMIN — GUAIFENESIN 600 MG: 600 TABLET ORAL at 08:09

## 2024-10-31 RX ADMIN — ACETAMINOPHEN 325MG 650 MG: 325 TABLET ORAL at 15:20

## 2024-10-31 RX ADMIN — DULOXETINE HYDROCHLORIDE 20 MG: 20 CAPSULE, DELAYED RELEASE ORAL at 12:06

## 2024-10-31 RX ADMIN — PANTOPRAZOLE SODIUM 40 MG: 40 TABLET, DELAYED RELEASE ORAL at 08:09

## 2024-10-31 RX ADMIN — MIDODRINE HYDROCHLORIDE 10 MG: 10 TABLET ORAL at 09:52

## 2024-10-31 RX ADMIN — PREDNISONE 10 MG: 10 TABLET ORAL at 12:18

## 2024-10-31 RX ADMIN — TIOTROPIUM BROMIDE INHALATION SPRAY 2 PUFF: 3.12 SPRAY, METERED RESPIRATORY (INHALATION) at 04:22

## 2024-10-31 RX ADMIN — MIDODRINE HYDROCHLORIDE 10 MG: 10 TABLET ORAL at 08:09

## 2024-10-31 RX ADMIN — PANTOPRAZOLE SODIUM 40 MG: 40 TABLET, DELAYED RELEASE ORAL at 15:21

## 2024-10-31 RX ADMIN — MIDODRINE HYDROCHLORIDE 20 MG: 10 TABLET ORAL at 11:57

## 2024-10-31 RX ADMIN — MIDODRINE HYDROCHLORIDE 20 MG: 10 TABLET ORAL at 15:20

## 2024-10-31 ASSESSMENT — ENCOUNTER SYMPTOMS
ABDOMINAL PAIN: 1
STRIDOR: 0
NAUSEA: 0
SHORTNESS OF BREATH: 0
WHEEZING: 0
ABDOMINAL PAIN: 0
BLOOD IN STOOL: 0
COUGH: 0
RESPIRATORY NEGATIVE: 1
CHEST TIGHTNESS: 0
ABDOMINAL DISTENTION: 0
EYES NEGATIVE: 1

## 2024-10-31 ASSESSMENT — PAIN SCALES - GENERAL: PAINLEVEL_OUTOF10: 2

## 2024-10-31 ASSESSMENT — PAIN DESCRIPTION - ORIENTATION: ORIENTATION: LOWER

## 2024-10-31 ASSESSMENT — PAIN DESCRIPTION - LOCATION: LOCATION: BACK

## 2024-10-31 ASSESSMENT — PAIN DESCRIPTION - DESCRIPTORS: DESCRIPTORS: ACHING

## 2024-10-31 ASSESSMENT — PAIN - FUNCTIONAL ASSESSMENT: PAIN_FUNCTIONAL_ASSESSMENT: ACTIVITIES ARE NOT PREVENTED

## 2024-10-31 NOTE — PROGRESS NOTES
Critical Care Progress Note    10/31/2024 8:42 AM    Subjective:   Admit Date: 10/29/2024  PCP: Elizabeth Crain MD    Chief Complaint   Patient presents with    Hematemesis     Pt c/o vomiting blood x 1 this am     Interval History: Had an EGD yesterday.  No major bleeding.  Continues to be on Levophed.  Continues to be on midodrine 10 mg 3 times daily.  Heart rate is in the 50s.  Currently on room air.       Medications:   Scheduled Meds:   pantoprazole  40 mg Oral BID AC    ARIPiprazole  2 mg Oral Daily    atorvastatin  20 mg Oral Nightly    tiotropium  2 puff Inhalation Daily RT    [Held by provider] metoprolol succinate  25 mg Oral Daily    sodium chloride flush  5-40 mL IntraVENous 2 times per day    guaiFENesin  600 mg Oral BID    midodrine  10 mg Oral TID WC    methylPREDNISolone  20 mg IntraVENous Daily     Continuous Infusions:   norepinephrine-sodium chloride 9 mcg/min (10/31/24 0735)    sodium chloride           Objective:   Vitals:   Temp (24hrs), Av.6 °F (37 °C), Min:97.8 °F (36.6 °C), Max:99.5 °F (37.5 °C)    BP (!) 109/52   Pulse 53   Temp 97.8 °F (36.6 °C) (Oral)   Resp (!) 9   Ht 1.651 m (5' 5\")   Wt 51.7 kg (114 lb)   SpO2 96%   BMI 18.97 kg/m²   I/O:24HR INTAKE/OUTPUT:    Intake/Output Summary (Last 24 hours) at 10/31/2024 0842  Last data filed at 10/31/2024 0550  Gross per 24 hour   Intake 797.83 ml   Output 450 ml   Net 347.83 ml     10/30 07 - 10/31 0700  In: 797.8 [I.V.:306.1]  Out: 450 [Urine:450]  CVP:          Physical Exam:  General appearance - alert, ill appearing, and in mild distress  Mental status - alert, oriented to person, place, and time  Eyes - pupils equal and reactive, extraocular eye movements intact  Nose - normal and patent, no erythema, discharge or polyps  Neck - supple, no significant adenopathy  Chest - clear to auscultation, no wheezes   Heart - normal rate, regular rhythm, normal S1, S2   Abdomen - soft, nontender, nondistended   Rectal - deferred, not  clinically indicated  Neurological - alert, oriented, normal speech, no focal findings  y  Musculoskeletal - no joint tenderness, deformity or swelling  Extremities - peripheral pulses normal, no pedal edema   Skin - normal coloration and turgor, no rashes               BMP:    Recent Labs     10/29/24  1109 10/30/24  0527 10/31/24  0709   * 132* 135   K 3.9 4.5 4.2   CL 97 103 104   CO2 26 18* 21   BUN 18 15 12   CREATININE 0.79 0.76 0.75   GLUCOSE 87 150* 149*   MG 1.5* 1.9 1.7    .  MG:3,PHOS:3)@  Ionized Calcium: No components found for: \"IONCA\"  CBC:   Recent Labs     10/30/24  0527 10/30/24  1515 10/31/24  0709   WBC 15.0*  --  17.6*   HGB 13.7 13.0 12.2   *  --  398      ABG: No results for input(s): \"PH\", \"PCO2\", \"PO2\" in the last 72 hours.        Assessment and Plan:       Impression:    - Hypotension with shock.  Likely hypovolemic.  Continues to be on Levophed  -Upper GI bleeding status post EGD.  -Leukocytosis, likely reactive.  - HfrEF with exacerbation.  -Mild hyponatremia.  This is better      Recommendations:    -Continue current care in the ICU for hemodynamic and neuro monitoring.  -Vasopressors to keep MAP above 65  -Status post EGD.  Continue PPI.  - GI consultation.  -Strict intake and output measurement.  Watch urine output  -DVT and GI prophylaxis.        Prophylaxis:  Stress ulcer: [] PPI Agent [] H2RA [] Sucralfate [] Other:   VTE: [] Enoxaparin  [] SC Heparin  [] SCD      Full Code      Excluding procedures, the total critical care time caring for this patient with life threatening, unstable organ failure, including direct patient contact, review of medical record, management of life support systems, review of data including imaging and labs, discussions with other team members, patient's family and physicians at least 31 minutes so far today.        Electronically signed by Joycelyn Dutton MD on 10/31/2024 at 8:42 AM

## 2024-10-31 NOTE — PROGRESS NOTES
(10/31/2024)    Social Connections ([facility] HRSN)     If for any reason you need help with day-to-day activities such as bathing, preparing meals, shopping, managing finances, etc., do you get the help you need?: Not on file   Intimate Partner Violence: Not on file   Housing Stability: Low Risk  (10/29/2024)    Housing Stability Vital Sign     Unable to Pay for Housing in the Last Year: No     Number of Times Moved in the Last Year: 0     Homeless in the Last Year: No       Vitals:  BP (!) 109/52   Pulse 53   Temp 97.8 °F (36.6 °C) (Oral)   Resp (!) 9   Ht 1.651 m (5' 5\")   Wt 51.7 kg (114 lb)   SpO2 96%   BMI 18.97 kg/m²   Temp (24hrs), Av.6 °F (37 °C), Min:97.8 °F (36.6 °C), Max:99.5 °F (37.5 °C)    No results for input(s): \"POCGLU\" in the last 72 hours.    I/O(24Hr):    Intake/Output Summary (Last 24 hours) at 10/31/2024 0837  Last data filed at 10/31/2024 0550  Gross per 24 hour   Intake 797.83 ml   Output 450 ml   Net 347.83 ml       Labs:  Hematology:  Recent Labs     10/30/24  0527 10/30/24  1515 10/31/24  0709   WBC 15.0*  --  17.6*   HGB 13.7   < > 12.2   HCT 38.2   < > 35.1*   *  --  398   PROTIME 15.5*  --   --    INR 1.2  --   --    APTT 26.6  --   --     < > = values in this interval not displayed.     Chemistry:  Recent Labs     10/31/24  0709      K 4.2      CO2 21   GLUCOSE 149*   BUN 12   CREATININE 0.75   MG 1.7   ANIONGAP 10   LABGLOM >90.0   CALCIUM 7.6*       Urine CultureNo results found for: \"LABURIN\"      Physical Examination:        Physical Exam  Abdominal:      General: There is no distension.      Palpations: Abdomen is soft.      Tenderness: There is no abdominal tenderness. There is no guarding or rebound.   Neurological:      Mental Status: She is alert.   Psychiatric:         Mood and Affect: Mood normal.         Assessment:        Primary Problem    This is a 55 yo female with h/o Anxiety, Asthma, COPD, and admitted for hematemesis and being evaluated by GI  and in the ICU for hypotension with a 7 mm Rt UVJ stone which is asymptomatic currently. There is no evidence for infection and there are no plans currently for any  intervention. When her BP improves, can start daily Flomax and will arrange for outpt follow-up.    Gastrointestinal hemorrhage     Active Hospital Problems    Diagnosis Date Noted    Hypotension [I95.9] 10/30/2024    Postobstructive pneumonia [J18.9] 10/30/2024    Gastrointestinal hemorrhage [K92.2] 10/29/2024    Ureteric stone [N20.1] 10/29/2024         Plan:        OK for D/C from  standpoint, my office will call to arrange F/U      Electronically signed by Hector Borden MD on 10/31/2024 at 8:37 AM

## 2024-10-31 NOTE — PROGRESS NOTES
Comprehensive Nutrition Assessment    Type and Reason for Visit:  Initial, Positive nutrition screen, Wound    Nutrition Recommendations/Plan:   Continue general diet as tolerated  Pt not interested in any oral supplements at this time  Counseled on importance of adequate energy & protein intake, for recovery and disease management, with emphasis on nutrient rich food choices   Dietitian Recommendations : consider daily MVI with minerals to aid in meeting micronutrient needs to support wound healing, thank you       Malnutrition Assessment:  Malnutrition Status:  Moderate malnutrition (10/31/24 1040)    Context:  Chronic Illness     Findings of the 6 clinical characteristics of malnutrition:  Energy Intake:  75% or less estimated energy requirements for 1 month or longer  Weight Loss:  No weight loss     Body Fat Loss:  Mild body fat loss Orbital, Triceps   Muscle Mass Loss:  Mild muscle mass loss Temples (temporalis), Clavicles (pectoralis & deltoids), Calf (gastrocnemius)  Fluid Accumulation:  No fluid accumulation     Strength:  Not Performed    Nutrition Assessment:    Pt admitted with moderate malnutrition, related to chronic illness ( COPD, CHF, Crohns), in general meets < 75% estimated needs, losses of adipose and muscle stores noted, current diet appropriate and tolerated, by not interested in oral nutrition supplements at this time.    Nutrition Related Findings:    presents for complaints of vomiting blood and heartburn, Hx of Crohns disease, CHF, COPD, had EGD ( showed reflux esophagitis, hiatal hernia), pt denies any GI symptoms at present, follows dietary guidelines for GERD at home, denies need for handout or education, labs noted ( steroid induced hyperglycemia), Meds reviewed, currently on levophed Wound Type: Pressure Injury, Stage II (sacrum ( small), traumatic wound shin)       Current Nutrition Intake & Therapies:    Average Meal Intake: 1-25%  Average Supplements Intake: None Ordered  ADULT

## 2024-10-31 NOTE — PROGRESS NOTES
Report received from Aric JUDD. Assessments completed, see flowsheets. Patient alert and oriented x4. Remains on levophed, see titrations in emar. C/o pain in back/shoulders \"because of laying in bed\" per patient, PRN tylenol given, patient satisfied. Patient able to make needs known. Report given to Adalgisa JUDD.

## 2024-10-31 NOTE — PLAN OF CARE
Nutrition Problem #1: Moderate malnutrition, in context of chronic illness  Intervention: Food and/or Nutrient Delivery: Continue Current Diet  Nutritional

## 2024-10-31 NOTE — CARE COORDINATION
AM TEAM QUALITY ICU ROUNDS AT BEDSIDE. NO FAMILY PRESENT. ON RA. PT IS AWAKE, ALERT AND TALKING. ON LEVOPHED, MIDODRINE,SOLUMEDROL. S/P EGD, ON PPI. MAY NEED PT/OT EVALUATION WHEN MEDICALLY INDICATED. FROM HOME W/SON, RECENTLY RECEIVED LIFE VEST. HOME O2 THROUGH [facility] PER NOTES.

## 2024-10-31 NOTE — CARE COORDINATION
Met with patient.  Definition of pneumonia discussed.   Causes of different types of pneumonia reviewed.   Symptoms discussed and pt does understand that they may have some or all of these symptoms.   Testing to diagnose pneumonia reviewed as well as possible treatments.  Importance of avoiding infections discussed including: taking medication as directed, washing hands, disposing of used tissues, getting the pneumonia and flu vaccines, and avoiding others who are ill.   Importance of not smoking and to avoid others who may be smoking around patient stressed.   Pneumonia Zones also reviewed. \"Green\" zone is the goal, \"Yellow\" zone means to call the doctor, and \"Red\" zone means to call the doctor ASAP or call 911.   Copies of Pneumonia booklet and Zone Pamphlet given to patient.   Patient reports that Dr. Dutton thinks that she may have had atelectasis instead of pneumonia.  Offer for patient to express any concerns or questions. Pt denies having further questions at this time.     Electronically signed by Artem Castillo RN on 10/31/2024 at 10:30 AM

## 2024-10-31 NOTE — DISCHARGE SUMMARY
[facility] Hospital Medicine Discharge Summary    Jhoana London  :  1970  MRN:  [id number]    Admit date:  10/29/2024  Discharge date:  10/31/2024    Admitting Physician:  Ambrosio Rausch DO  Primary Care Physician:  Elizabeth Crain MD    Discharge Diagnoses:    Principal Problem:    Gastrointestinal hemorrhage  Active Problems:    Ureteric stone    Hypotension    Postobstructive pneumonia    Moderate malnutrition (HCC)  Resolved Problems:    * No resolved hospital problems. *    Chief Complaint   Patient presents with    Hematemesis     Pt c/o vomiting blood x 1 this am     Condition: improved   Activity: no restrictions   Diet: regular  Disposition: home  Functional Status: ambulatory    Significant Findings:     Recent Labs     10/31/24  1409 10/31/24  0709 10/30/24  1515 10/30/24  0527 10/29/24  1852 10/29/24  1109   WBC  --  17.6*  --  15.0*  --  16.7*   HGB 11.3* 12.2 13.0 13.7   < > 16.2*   HCT 32.5* 35.1* 37.4 38.2   < > 47.3*   MCV  --  91.6  --  92.7  --  94.4   PLT  --  398  --  434*  --  410*    < > = values in this interval not displayed.     EGD:         -  LA Grade D reflux esophagitis with no bleeding.         -  Z-line irregular, 35 cm from the incisors.         -  7 cm hiatal hernia.         - Because of a hiatal hernia and abnormal configuration of the stomach there was excessive looping of the scope in the stomach.         -  Normal examined duodenum.         -  No specimens collected.    TTE:    Left Ventricle: Normal left ventricular systolic function with a visually estimated EF of 60 - 65%. Left ventricle size is normal. Normal wall thickness. Normal wall motion. Normal diastolic function.    Right Ventricle: Normal systolic function.    Tricuspid Valve: Normal RVSP.    Pericardium: No pericardial effusion.    Image quality is adequate.      Hospital Course:   54-year-old female who was recently discharged 10/25 after hospitalization for nonischemic cardiomyopathy and

## 2024-10-31 NOTE — PROGRESS NOTES
PROGRESS NOTE    Patient Name: Jhoana London  Admit Date: 10/29/2024 10:32 AM  MR #: [id number]  : 1970    Attending Physician: Ambrosio Rausch DO  Reason for consult: CV care    History of Presenting Illness:      Jhoana London is a 54 y.o. female on hospital day 2 with a history of .   History Obtained From:  patient, electronic medical record    53 yo female with PAD CAD PAF NICM Crohn's recently discharged after NSTEMI.  She presents with 1 episode Hematemesis.  She has heartburn sensation relieved with Protonix.  Hb is stable.     She has MIld CAD.  PAD and had been on DAPT w Plavix.  On last admit she had a bout of Rapid AF which converted Spontaneously to SR.  ELiquis was started.  Plavix continued but ASA stopped.     For NICM EF 20% a Life Vest was ordered prior to recent DC.     She has no CP nor SOB.     10/31/24 Tele SR 72. On Levophed. No cp no sob feels well. Pt is very eager to go home.    History:      EKG:  Past Medical History:   Diagnosis Date    Acid reflux     Anxiety     Asthma     COPD (chronic obstructive pulmonary disease) (HCC)     Crohn's colitis (HCC)     Ejection fraction < 50%     Heart failure (HCC)     PTSD (post-traumatic stress disorder)     Smoker      Past Surgical History:   Procedure Laterality Date    CARDIAC PROCEDURE N/A 10/23/2024    Left heart cath / coronary angiography performed by Fred Avila DO at [facility] [place] CARDIAC CATH LAB    CHOLECYSTECTOMY      HIATAL HERNIA REPAIR      TONSILLECTOMY      UPPER GASTROINTESTINAL ENDOSCOPY N/A 10/30/2024    ESOPHAGOGASTRODUODENOSCOPY performed by Nahid Riley MD at [facility] [place] GASTRO [facility]ENTER       Family History  History reviewed. No pertinent family history.  [] Unable to obtain due to ventilated and/ or neurologic status    Social History     Socioeconomic History    Marital status:      Spouse name: Not on file    Number of children: Not on file    Years of education: Not on file    Highest education level: Not on file  right lower lobe suggesting mucous. 4. A lesion in the right lower lobe and in the right upper lobe that are suggestive of areas of scarring, but each having a nonspecific nodular area, up to 1 cm size such that neoplasm is possible.  Per Fleischner society guidelines, consider a noncontrast chest CT at 3 months, a PET-CT, or tissue sampling.     XR CHEST (2 VW)    Result Date: 10/22/2024  EXAMINATION: TWO XRAY VIEWS OF THE CHEST 10/22/2024 7:05 pm COMPARISON: None. HISTORY: ORDERING SYSTEM PROVIDED HISTORY: SOB TECHNOLOGIST PROVIDED HISTORY: Reason for exam:->SOB What reading provider will be dictating this exam?->CRC FINDINGS: The lungs are hyperaerated without acute focal process.  There is no effusion or pneumothorax. The cardiomediastinal silhouette is without acute process. The osseous structures are without acute process.     No acute process. COPD.       Active Hospital Problems    Diagnosis Date Noted    Hypotension [I95.9] 10/30/2024     Priority: Low    Postobstructive pneumonia [J18.9] 10/30/2024     Priority: Low    Gastrointestinal hemorrhage [K92.2] 10/29/2024     Priority: Low    Ureteric stone [N20.1] 10/29/2024     Priority: Low         Impression/Plan:   UGI Bleed - proceed with GI Evaluation.  Hold Plavix and Eliquis. EGD done yesterday - I discussed w Dr. Riley this AM.  He noted Esophagitis. No active bleed. He recommends resuming ELiquis and Plavix in few days.   CAD - Mild D1 50%. - will resume CV meds when able.   Hypotension - on Levophed and Midodrine.  She was on Midodrine in the past as well.   Wean Levo and Midodrine as much as possible.   PAF - in SR. Hold Eliquis.   NICM EF 20% - LVEF has completely recovered int he interim. LVEF is now 60-65%. I have independently reviewed Images to verify this remarkable improvement. .will need to resume Entresto and BB when BP allows.  She will no longer need Life Vest nor ICD.   PAD - stable with no claudication.   HPL - resume Statin  Crohn's

## 2024-10-31 NOTE — FLOWSHEET NOTE
Shift summary;    0700 Bedside report received from Kirstie JUDD.     0800 Shift assessment completed. Pt is A&Ox4 and able to follow commands. Pt currently on Levophed gtt (see MAR for titration details).     0930 ICU rounds completed, see new orders.     1255 Levophed gtt stopped. Goal is SBP greater then 90.     1400 Pt requesting to be discharged since Levophed drip has been discontinued, Dr. Rausch aware.     1545 Discharge orders received. Discharge instructions given to the pt. Pt verbalized understanding. Peripheral IV's removed.     Electronically signed by Adalgisa Khan RN on 10/31/2024 at 4:42 PM

## 2024-11-01 ENCOUNTER — TELEPHONE (OUTPATIENT)
Dept: CARDIOLOGY CLINIC | Age: 54
End: 2024-11-01

## 2024-11-01 NOTE — TELEPHONE ENCOUNTER
Maurilio rocha [facility] is requesting pt's discharge summary to be faxed to them.    Fax # [phone]

## 2024-11-03 LAB
BACTERIA BLD CULT ORG #2: NORMAL
BACTERIA BLD CULT: NORMAL

## 2024-11-12 ENCOUNTER — OFFICE VISIT (OUTPATIENT)
Dept: CARDIOLOGY CLINIC | Age: 54
End: 2024-11-12

## 2024-11-12 VITALS
HEART RATE: 82 BPM | WEIGHT: 117 LBS | RESPIRATION RATE: 16 BRPM | OXYGEN SATURATION: 99 % | SYSTOLIC BLOOD PRESSURE: 110 MMHG | DIASTOLIC BLOOD PRESSURE: 60 MMHG | BODY MASS INDEX: 19.47 KG/M2

## 2024-11-12 DIAGNOSIS — I95.9 HYPOTENSION, UNSPECIFIED HYPOTENSION TYPE: Primary | ICD-10-CM

## 2024-11-12 DIAGNOSIS — O26.50 MATERNAL HYPOTENSION SYNDROME, ANTEPARTUM: ICD-10-CM

## 2024-11-12 DIAGNOSIS — I42.8 NICM (NONISCHEMIC CARDIOMYOPATHY) (HCC): ICD-10-CM

## 2024-11-12 DIAGNOSIS — R09.89 BILATERAL CAROTID BRUITS: ICD-10-CM

## 2024-11-12 RX ORDER — CLOPIDOGREL BISULFATE 75 MG/1
75 TABLET ORAL DAILY
Qty: 90 TABLET | Refills: 3 | Status: SHIPPED | OUTPATIENT
Start: 2024-11-12

## 2024-11-12 ASSESSMENT — ENCOUNTER SYMPTOMS
GASTROINTESTINAL NEGATIVE: 1
STRIDOR: 0
BLOOD IN STOOL: 0
COUGH: 0
WHEEZING: 0
NAUSEA: 0
RESPIRATORY NEGATIVE: 1
SHORTNESS OF BREATH: 0
CHEST TIGHTNESS: 0
EYES NEGATIVE: 1

## 2024-11-22 PROBLEM — R79.89 ELEVATED TROPONIN: Status: RESOLVED | Noted: 2024-10-23 | Resolved: 2024-11-22

## 2024-12-20 ENCOUNTER — TELEPHONE (OUTPATIENT)
Dept: UROLOGY | Age: 54
End: 2024-12-20

## 2024-12-20 ENCOUNTER — HOSPITAL ENCOUNTER (OUTPATIENT)
Dept: GENERAL RADIOLOGY | Age: 54
Discharge: HOME | End: 2024-12-22
Payer: COMMERCIAL

## 2024-12-20 ENCOUNTER — OFFICE VISIT (OUTPATIENT)
Dept: UROLOGY | Age: 54
End: 2024-12-20
Payer: COMMERCIAL

## 2024-12-20 VITALS
BODY MASS INDEX: 19.49 KG/M2 | HEART RATE: 68 BPM | HEIGHT: 65 IN | DIASTOLIC BLOOD PRESSURE: 64 MMHG | SYSTOLIC BLOOD PRESSURE: 124 MMHG | WEIGHT: 117 LBS

## 2024-12-20 DIAGNOSIS — N20.0 KIDNEY STONE: ICD-10-CM

## 2024-12-20 DIAGNOSIS — N20.0 KIDNEY STONE: Primary | ICD-10-CM

## 2024-12-20 DIAGNOSIS — N20.1 URETERIC STONE: Primary | ICD-10-CM

## 2024-12-20 LAB
BILIRUBIN, POC: NEGATIVE
BLOOD URINE, POC: NEGATIVE
CLARITY, POC: CLEAR
COLOR, POC: YELLOW
GLUCOSE URINE, POC: NEGATIVE MG/DL
KETONES, POC: NEGATIVE MG/DL
LEUKOCYTE EST, POC: NORMAL
NITRITE, POC: NEGATIVE
PH, POC: 6.5
PROTEIN, POC: NEGATIVE MG/DL
SPECIFIC GRAVITY, POC: 1.02
UROBILINOGEN, POC: 0.2 MG/DL

## 2024-12-20 PROCEDURE — G8427 DOCREV CUR MEDS BY ELIG CLIN: HCPCS | Performed by: PHYSICIAN ASSISTANT

## 2024-12-20 PROCEDURE — 4004F PT TOBACCO SCREEN RCVD TLK: CPT | Performed by: PHYSICIAN ASSISTANT

## 2024-12-20 PROCEDURE — 99203 OFFICE O/P NEW LOW 30 MIN: CPT | Performed by: PHYSICIAN ASSISTANT

## 2024-12-20 PROCEDURE — 74018 RADEX ABDOMEN 1 VIEW: CPT

## 2024-12-20 PROCEDURE — 81003 URINALYSIS AUTO W/O SCOPE: CPT | Performed by: PHYSICIAN ASSISTANT

## 2024-12-20 PROCEDURE — 3017F COLORECTAL CA SCREEN DOC REV: CPT | Performed by: PHYSICIAN ASSISTANT

## 2024-12-20 PROCEDURE — G8420 CALC BMI NORM PARAMETERS: HCPCS | Performed by: PHYSICIAN ASSISTANT

## 2024-12-20 PROCEDURE — G8484 FLU IMMUNIZE NO ADMIN: HCPCS | Performed by: PHYSICIAN ASSISTANT

## 2024-12-20 RX ORDER — PANTOPRAZOLE SODIUM 40 MG/1
40 TABLET, DELAYED RELEASE ORAL
Qty: 60 TABLET | Refills: 1 | Status: SHIPPED | OUTPATIENT
Start: 2024-12-20

## 2024-12-20 RX ORDER — TAMSULOSIN HYDROCHLORIDE 0.4 MG/1
0.4 CAPSULE ORAL DAILY
Qty: 30 CAPSULE | Refills: 0 | Status: SHIPPED | OUTPATIENT
Start: 2024-12-20

## 2024-12-20 ASSESSMENT — ENCOUNTER SYMPTOMS: APNEA: 0

## 2024-12-20 NOTE — PROGRESS NOTES
Subjective:      Patient ID: Jhoana London is a 54 y.o. female    HPI  54 year old female who was hospitalized in 10/2024, where she had a subsequent CT finding of a 7 mm right UVJ stone. She currently denies flank pain or abdominal pain. She denies gross hematuria and dysuria. She denies N/V. She is uncertain if she passed the stone. U/a is negative today.    Past Medical History:   Diagnosis Date    Acid reflux     Anxiety     Asthma     COPD (chronic obstructive pulmonary disease) (HCC)     Crohn's colitis (HCC)     Ejection fraction < 50%     Heart failure (HCC)     PTSD (post-traumatic stress disorder)     Smoker      Past Surgical History:   Procedure Laterality Date    CARDIAC PROCEDURE N/A 10/23/2024    Left heart cath / coronary angiography performed by Fred Avila DO at [facility] CARDIAC CATH LAB    CHOLECYSTECTOMY      HIATAL HERNIA REPAIR      TONSILLECTOMY      UPPER GASTROINTESTINAL ENDOSCOPY N/A 10/30/2024    ESOPHAGOGASTRODUODENOSCOPY performed by Nahid Riley MD at [facility]     Social History     Socioeconomic History    Marital status:      Spouse name: None    Number of children: None    Years of education: None    Highest education level: None   Tobacco Use    Smoking status: Every Day     Current packs/day: 1.00     Average packs/day: 1 pack/day for 20.0 years (20.0 ttl pk-yrs)     Types: Cigarettes     Passive exposure: Current    Smokeless tobacco: Never   Vaping Use    Vaping status: Never Used   Substance and Sexual Activity    Alcohol use: Not Currently    Drug use: Yes     Types: Marijuana (Weed)     Comment: edible     Social Determinants of Health     Financial Resource Strain: Medium Risk (1/12/2024)    Received from [facility]    Overall Financial Resource Strain (CARDIA)     Difficulty of Paying Living Expenses: Somewhat hard   Food Insecurity: No Food Insecurity (10/29/2024)    Hunger Vital Sign     Worried About Running Out of Food in the Last Year: Never  15

## 2024-12-20 NOTE — TELEPHONE ENCOUNTER
Requesting medication refill. Please approve or deny this request.    Rx requested:  Requested Prescriptions     Pending Prescriptions Disp Refills    pantoprazole (PROTONIX) 40 MG tablet 60 tablet 1     Sig: Take 1 tablet by mouth 2 times daily (before meals)         Last Office Visit:   11/12/2024      Next Visit Date:  Future Appointments   Date Time Provider Department Center   1/10/2025 10:00 AM [facility] 1 [facility]   1/14/2025 11:00 AM Keshawn Alvarado PA [facility]   3/18/2025 12:00 PM Zion Jeffries MD [facility]

## 2025-01-17 DIAGNOSIS — N20.1 URETERIC STONE: ICD-10-CM

## 2025-01-20 RX ORDER — TAMSULOSIN HYDROCHLORIDE 0.4 MG/1
0.4 CAPSULE ORAL DAILY
Qty: 30 CAPSULE | Refills: 0 | Status: SHIPPED | OUTPATIENT
Start: 2025-01-20

## 2025-01-24 DIAGNOSIS — N20.1 URETERIC STONE: ICD-10-CM

## 2025-01-24 RX ORDER — TAMSULOSIN HYDROCHLORIDE 0.4 MG/1
0.4 CAPSULE ORAL DAILY
Qty: 30 CAPSULE | Refills: 0 | OUTPATIENT
Start: 2025-01-24

## 2025-01-24 RX ORDER — PANTOPRAZOLE SODIUM 40 MG/1
40 TABLET, DELAYED RELEASE ORAL
Qty: 180 TABLET | Refills: 3 | Status: SHIPPED | OUTPATIENT
Start: 2025-01-24

## 2025-01-24 NOTE — TELEPHONE ENCOUNTER
Requesting medication refill. Please approve or deny this request.    Rx requested:  Requested Prescriptions     Pending Prescriptions Disp Refills    pantoprazole (PROTONIX) 40 MG tablet [Pharmacy Med Name: PANTOPRAZOLE 40MG TABLETS] 60 tablet 1     Sig: TAKE 1 TABLET BY MOUTH TWICE DAILY BEFORE MEALS         Last Office Visit:   11/12/2024      Next Visit Date:  Future Appointments   Date Time Provider Department Center   2/3/2025  9:00 AM [facility] ROOM 1 [facility]   3/18/2025 12:00 PM Zion Jeffries MD [facility]

## 2025-02-11 ENCOUNTER — HOSPITAL ENCOUNTER (OUTPATIENT)
Dept: CT IMAGING | Age: 55
Discharge: HOME | End: 2025-02-13
Payer: COMMERCIAL

## 2025-02-11 DIAGNOSIS — N20.1 URETERIC STONE: ICD-10-CM

## 2025-02-11 PROCEDURE — 74176 CT ABD & PELVIS W/O CONTRAST: CPT

## 2025-02-25 RX ORDER — PANTOPRAZOLE SODIUM 40 MG/1
40 TABLET, DELAYED RELEASE ORAL
Qty: 180 TABLET | Refills: 3 | Status: SHIPPED | OUTPATIENT
Start: 2025-02-25

## 2025-02-25 NOTE — TELEPHONE ENCOUNTER
Requesting medication refill. Please approve or deny this request.    Rx requested:  Requested Prescriptions     Pending Prescriptions Disp Refills    pantoprazole (PROTONIX) 40 MG tablet 180 tablet 3     Sig: Take 1 tablet by mouth 2 times daily (before meals)         Last Office Visit:   11/12/2024      Next Visit Date:  Future Appointments   Date Time Provider Department Center   3/18/2025 12:00 PM Zion Jeffries MD [facility]                Please approve or deny.

## 2025-04-08 ENCOUNTER — OFFICE VISIT (OUTPATIENT)
Age: 55
End: 2025-04-08
Payer: COMMERCIAL

## 2025-04-08 VITALS
SYSTOLIC BLOOD PRESSURE: 86 MMHG | DIASTOLIC BLOOD PRESSURE: 56 MMHG | BODY MASS INDEX: 18.77 KG/M2 | WEIGHT: 112.8 LBS | OXYGEN SATURATION: 98 % | HEART RATE: 71 BPM

## 2025-04-08 DIAGNOSIS — O26.50 MATERNAL HYPOTENSION SYNDROME, ANTEPARTUM: ICD-10-CM

## 2025-04-08 DIAGNOSIS — I42.8 NICM (NONISCHEMIC CARDIOMYOPATHY) (HCC): ICD-10-CM

## 2025-04-08 DIAGNOSIS — I95.9 HYPOTENSION, UNSPECIFIED HYPOTENSION TYPE: Primary | ICD-10-CM

## 2025-04-08 DIAGNOSIS — R09.89 BILATERAL CAROTID BRUITS: ICD-10-CM

## 2025-04-08 PROCEDURE — 99213 OFFICE O/P EST LOW 20 MIN: CPT | Performed by: INTERNAL MEDICINE

## 2025-04-08 RX ORDER — DEXTROAMPHETAMINE SACCHARATE, AMPHETAMINE ASPARTATE MONOHYDRATE, DEXTROAMPHETAMINE SULFATE AND AMPHETAMINE SULFATE 2.5; 2.5; 2.5; 2.5 MG/1; MG/1; MG/1; MG/1
10 CAPSULE, EXTENDED RELEASE ORAL EVERY MORNING
COMMUNITY
Start: 2025-04-07

## 2025-04-08 ASSESSMENT — ENCOUNTER SYMPTOMS
RESPIRATORY NEGATIVE: 1
STRIDOR: 0
CHEST TIGHTNESS: 0
WHEEZING: 0
BLOOD IN STOOL: 0
COUGH: 0
EYES NEGATIVE: 1
SHORTNESS OF BREATH: 0
GASTROINTESTINAL NEGATIVE: 1
NAUSEA: 0

## 2025-04-08 NOTE — PROGRESS NOTES
10/31/2024 07:09 AM    BUN 12 10/31/2024 07:09 AM    CREATININE 0.75 10/31/2024 07:09 AM    CALCIUM 7.6 10/31/2024 07:09 AM    LABGLOM >90.0 10/31/2024 07:09 AM    GLUCOSE 149 10/31/2024 07:09 AM     Magnesium:    Lab Results   Component Value Date/Time    MG 1.7 10/31/2024 07:09 AM     TSH:  Lab Results   Component Value Date    TSH 0.368 (L) 10/24/2024       Patient Active Problem List   Diagnosis    NSTEMI (non-ST elevated myocardial infarction) ([facility])    Smoker    Gastrointestinal hemorrhage    Ureteric stone    Hypotension    Postobstructive pneumonia    GI bleeding    Moderate malnutrition       There are no discontinued medications.      Modified Medications    No medications on file       No orders of the defined types were placed in this encounter.      Assessment/Plan:    NICM - Reocered. As BP improves we will add additional meds.     Hypotension - stable     Smoker - stop    Mild CAD- continue Plavix.     PAF - pt was a ble to sense. No further AF in the hosp. Eliquis was stopped due to Hematemesis.   If she has palpitations let us know.     Carotid Bruits - CUS.     Counseling:  Heart Healthy Lifestyle, Stop Smoking, Low Salt Diet, Take Precautions to Prevent Falls, and Walk Daily    Return in about 6 months (around 10/8/2025).      Electronically signed by EVERT ALDRIDGE MD on 4/8/2025 at 3:21 PM

## (undated) DEVICE — GUIDEWIRE VASC L180CM DIA0.035IN 3MM PTFE J TIP EXCHG FIX

## (undated) DEVICE — 3M™ TEGADERM™ TRANSPARENT FILM DRESSING FRAME STYLE, 1626W, 4 IN X 4-3/4 IN (10 CM X 12 CM), 50/CT 4CT/CASE: Brand: 3M™ TEGADERM™

## (undated) DEVICE — GLOVE SURG SZ 65 THK91MIL LTX FREE SYN POLYISOPRENE

## (undated) DEVICE — CATHETER COR DIAG AMPLATZ R MOD CRV 5FR 100CM 0 SIDE H

## (undated) DEVICE — CONTAINER,SPECIMEN,OR STERILE,4OZ: Brand: MEDLINE

## (undated) DEVICE — BRUSH ENDO CLN L90.5IN SHTH DIA1.7MM BRIST DIA5-7MM 2-6MM

## (undated) DEVICE — GLOVE SURG SZ 6 THK91MIL LTX FREE SYN POLYISOPRENE ANTI

## (undated) DEVICE — ANGIO-SEAL VIP VASCULAR CLOSURE DEVICE: Brand: ANGIO-SEAL

## (undated) DEVICE — ENDO CARRY-ON PROCEDURE KIT: Brand: ENDO CARRY-ON PROCEDURE KIT

## (undated) DEVICE — SHEET,DRAPE,53X77,STERILE: Brand: MEDLINE

## (undated) DEVICE — SINGLE PORT MANIFOLD: Brand: NEPTUNE 2

## (undated) DEVICE — KIT ANGIO W/ AT P65 PREM HND CTRL FOR CNTRST DEL ANGIOTOUCH

## (undated) DEVICE — GLIDESHEATH SLENDER STAINLESS STEEL KIT: Brand: GLIDESHEATH SLENDER

## (undated) DEVICE — CATHETER COR DIAG PIGTAILS PIG 145 CRV 5FR 110CM 6 SIDE H

## (undated) DEVICE — APPLICATOR MEDICATED 10.5 CC SOLUTION HI LT ORNG CHLORAPREP

## (undated) DEVICE — TUBING, SUCTION, 1/4" X 10', STRAIGHT: Brand: MEDLINE

## (undated) DEVICE — CATHETER DIAG 5FR L100CM LUMN ID0.047IN JL4 CRV 0 SIDE H

## (undated) DEVICE — ELECTRODE TRAIN EDGE SYS W/ QUIK-COMBO CONN

## (undated) DEVICE — CONMED SCOPE SAVER BITE BLOCK, 20X27 MM: Brand: SCOPE SAVER

## (undated) DEVICE — TUBING IRRIGATION 140/160/180/190 SER GI ENDOSCP SMARTCAP

## (undated) DEVICE — TUBE SET 96 MM 64 MM H2O PERISTALTIC STD AUX CHANNEL

## (undated) DEVICE — Device

## (undated) DEVICE — SOLUTION IV 0.9% NACL IRRIGATION 3000ML BAG

## (undated) DEVICE — DRAPE SURG BRACH STRL

## (undated) DEVICE — GLOVE ORANGE PI 7 1/2   MSG9075

## (undated) DEVICE — KIT MFLD ISOLATN NACL CNTRST PRT TBNG SPIK W/ PRSS TRNSDUC